# Patient Record
Sex: FEMALE | Race: OTHER | HISPANIC OR LATINO | Employment: FULL TIME | URBAN - METROPOLITAN AREA
[De-identification: names, ages, dates, MRNs, and addresses within clinical notes are randomized per-mention and may not be internally consistent; named-entity substitution may affect disease eponyms.]

---

## 2020-07-20 ENCOUNTER — HOSPITAL ENCOUNTER (OUTPATIENT)
Dept: RADIOLOGY | Facility: MEDICAL CENTER | Age: 40
End: 2020-07-20
Attending: NURSE PRACTITIONER
Payer: COMMERCIAL

## 2020-07-20 DIAGNOSIS — R10.84 GENERALIZED ABDOMINAL PAIN: ICD-10-CM

## 2020-07-20 PROCEDURE — 76700 US EXAM ABDOM COMPLETE: CPT

## 2021-02-04 ENCOUNTER — HOSPITAL ENCOUNTER (OUTPATIENT)
Dept: HOSPITAL 8 - RAD | Age: 41
Discharge: HOME | End: 2021-02-04
Attending: INTERNAL MEDICINE
Payer: SELF-PAY

## 2021-02-04 DIAGNOSIS — K21.9: ICD-10-CM

## 2021-02-04 DIAGNOSIS — R13.19: ICD-10-CM

## 2021-02-04 DIAGNOSIS — R09.89: Primary | ICD-10-CM

## 2021-02-04 PROCEDURE — 74220 X-RAY XM ESOPHAGUS 1CNTRST: CPT

## 2021-02-04 PROCEDURE — 71046 X-RAY EXAM CHEST 2 VIEWS: CPT

## 2021-02-08 ENCOUNTER — HOSPITAL ENCOUNTER (OUTPATIENT)
Dept: HOSPITAL 8 - RAD | Age: 41
Discharge: HOME | End: 2021-02-08
Attending: INTERNAL MEDICINE
Payer: SELF-PAY

## 2021-02-08 DIAGNOSIS — K21.9: Primary | ICD-10-CM

## 2021-02-08 DIAGNOSIS — R13.19: ICD-10-CM

## 2021-02-08 DIAGNOSIS — R09.89: ICD-10-CM

## 2021-02-08 PROCEDURE — 74230 X-RAY XM SWLNG FUNCJ C+: CPT

## 2022-11-18 ENCOUNTER — IMMUNIZATION (OUTPATIENT)
Dept: OCCUPATIONAL MEDICINE | Facility: CLINIC | Age: 42
End: 2022-11-18

## 2022-11-18 DIAGNOSIS — Z23 NEED FOR VACCINATION: Primary | ICD-10-CM

## 2022-11-18 PROCEDURE — 99026 IN-HOSPITAL ON CALL SERVICE: CPT | Performed by: PREVENTIVE MEDICINE

## 2022-11-18 PROCEDURE — 90746 HEPB VACCINE 3 DOSE ADULT IM: CPT | Performed by: NURSE PRACTITIONER

## 2023-08-22 ENCOUNTER — HOSPITAL ENCOUNTER (OUTPATIENT)
Dept: LAB | Facility: MEDICAL CENTER | Age: 43
End: 2023-08-22
Attending: PHYSICIAN ASSISTANT
Payer: COMMERCIAL

## 2023-08-22 LAB
ALBUMIN SERPL BCP-MCNC: 4.1 G/DL (ref 3.2–4.9)
ALBUMIN/GLOB SERPL: 1.4 G/DL
ALP SERPL-CCNC: 123 U/L (ref 30–99)
ALT SERPL-CCNC: 17 U/L (ref 2–50)
ANION GAP SERPL CALC-SCNC: 12 MMOL/L (ref 7–16)
AST SERPL-CCNC: 15 U/L (ref 12–45)
BILIRUB SERPL-MCNC: <0.2 MG/DL (ref 0.1–1.5)
BUN SERPL-MCNC: 11 MG/DL (ref 8–22)
CALCIUM ALBUM COR SERPL-MCNC: 8.8 MG/DL (ref 8.5–10.5)
CALCIUM SERPL-MCNC: 8.9 MG/DL (ref 8.5–10.5)
CHLORIDE SERPL-SCNC: 101 MMOL/L (ref 96–112)
CHOLEST SERPL-MCNC: 295 MG/DL (ref 100–199)
CO2 SERPL-SCNC: 23 MMOL/L (ref 20–33)
CREAT SERPL-MCNC: 0.52 MG/DL (ref 0.5–1.4)
EST. AVERAGE GLUCOSE BLD GHB EST-MCNC: 355 MG/DL
FASTING STATUS PATIENT QL REPORTED: NORMAL
GFR SERPLBLD CREATININE-BSD FMLA CKD-EPI: 117 ML/MIN/1.73 M 2
GLOBULIN SER CALC-MCNC: 3 G/DL (ref 1.9–3.5)
GLUCOSE SERPL-MCNC: 334 MG/DL (ref 65–99)
HBA1C MFR BLD: 14 % (ref 4–5.6)
HCV AB SER QL: NORMAL
HDLC SERPL-MCNC: 48 MG/DL
LDLC SERPL CALC-MCNC: 173 MG/DL
POTASSIUM SERPL-SCNC: 4.2 MMOL/L (ref 3.6–5.5)
PROT SERPL-MCNC: 7.1 G/DL (ref 6–8.2)
SODIUM SERPL-SCNC: 136 MMOL/L (ref 135–145)
TRIGL SERPL-MCNC: 369 MG/DL (ref 0–149)
TSH SERPL DL<=0.005 MIU/L-ACNC: 1.15 UIU/ML (ref 0.38–5.33)

## 2023-08-22 PROCEDURE — 86803 HEPATITIS C AB TEST: CPT

## 2023-08-22 PROCEDURE — 84443 ASSAY THYROID STIM HORMONE: CPT

## 2023-08-22 PROCEDURE — 80053 COMPREHEN METABOLIC PANEL: CPT

## 2023-08-22 PROCEDURE — 36415 COLL VENOUS BLD VENIPUNCTURE: CPT

## 2023-08-22 PROCEDURE — 83036 HEMOGLOBIN GLYCOSYLATED A1C: CPT

## 2023-08-22 PROCEDURE — 80061 LIPID PANEL: CPT

## 2023-09-05 ENCOUNTER — HOSPITAL ENCOUNTER (OUTPATIENT)
Facility: MEDICAL CENTER | Age: 43
End: 2023-09-05
Attending: PHYSICIAN ASSISTANT
Payer: COMMERCIAL

## 2023-09-05 PROCEDURE — 87624 HPV HI-RISK TYP POOLED RSLT: CPT

## 2023-09-05 PROCEDURE — 88175 CYTOPATH C/V AUTO FLUID REDO: CPT

## 2023-09-06 LAB — AMBIGUOUS DTTM AMBI4: NORMAL

## 2023-09-07 LAB — AMBIGUOUS DTTM AMBI4: NORMAL

## 2023-09-08 LAB
CYTOLOGIST CVX/VAG CYTO: NORMAL
CYTOLOGY CVX/VAG DOC CYTO: NORMAL
CYTOLOGY CVX/VAG DOC THIN PREP: NORMAL
HPV I/H RISK 4 DNA CVX QL PROBE+SIG AMP: NEGATIVE
HPV REFLEX NL1174300: NORMAL
NOTE NL11727A: NORMAL
OTHER STN SPEC: NORMAL
STAT OF ADQ CVX/VAG CYTO-IMP: NORMAL

## 2023-09-19 ENCOUNTER — HOSPITAL ENCOUNTER (OUTPATIENT)
Facility: MEDICAL CENTER | Age: 43
End: 2023-09-19
Attending: PHYSICIAN ASSISTANT
Payer: COMMERCIAL

## 2023-09-19 ENCOUNTER — HOSPITAL ENCOUNTER (OUTPATIENT)
Dept: LAB | Facility: MEDICAL CENTER | Age: 43
End: 2023-09-19
Attending: PHYSICIAN ASSISTANT
Payer: COMMERCIAL

## 2023-09-19 LAB
ANION GAP SERPL CALC-SCNC: 12 MMOL/L (ref 7–16)
BUN SERPL-MCNC: 11 MG/DL (ref 8–22)
CALCIUM SERPL-MCNC: 9.4 MG/DL (ref 8.5–10.5)
CHLORIDE SERPL-SCNC: 103 MMOL/L (ref 96–112)
CO2 SERPL-SCNC: 22 MMOL/L (ref 20–33)
CREAT SERPL-MCNC: 0.52 MG/DL (ref 0.5–1.4)
EST. AVERAGE GLUCOSE BLD GHB EST-MCNC: 335 MG/DL
GFR SERPLBLD CREATININE-BSD FMLA CKD-EPI: 117 ML/MIN/1.73 M 2
GLUCOSE SERPL-MCNC: 257 MG/DL (ref 65–99)
HBA1C MFR BLD: 13.3 % (ref 4–5.6)
POTASSIUM SERPL-SCNC: 4.2 MMOL/L (ref 3.6–5.5)
SODIUM SERPL-SCNC: 137 MMOL/L (ref 135–145)

## 2023-09-19 PROCEDURE — 87186 SC STD MICRODIL/AGAR DIL: CPT

## 2023-09-19 PROCEDURE — 83036 HEMOGLOBIN GLYCOSYLATED A1C: CPT

## 2023-09-19 PROCEDURE — 87086 URINE CULTURE/COLONY COUNT: CPT

## 2023-09-19 PROCEDURE — 87077 CULTURE AEROBIC IDENTIFY: CPT

## 2023-09-19 PROCEDURE — 80048 BASIC METABOLIC PNL TOTAL CA: CPT

## 2023-09-19 PROCEDURE — 36415 COLL VENOUS BLD VENIPUNCTURE: CPT

## 2023-09-21 LAB
AMBIGUOUS DTTM AMBI4: NORMAL
SIGNIFICANT IND 70042: NORMAL
SITE SITE: NORMAL
SOURCE SOURCE: NORMAL

## 2023-09-23 LAB
BACTERIA UR CULT: ABNORMAL
BACTERIA UR CULT: ABNORMAL
SIGNIFICANT IND 70042: ABNORMAL
SITE SITE: ABNORMAL
SOURCE SOURCE: ABNORMAL

## 2024-03-11 ENCOUNTER — NON-PROVIDER VISIT (OUTPATIENT)
Dept: OCCUPATIONAL MEDICINE | Facility: CLINIC | Age: 44
End: 2024-03-11

## 2024-03-11 DIAGNOSIS — Z02.89 ENCOUNTER FOR OCCUPATIONAL HEALTH ASSESSMENT: Primary | ICD-10-CM

## 2024-03-12 PROCEDURE — 90746 HEPB VACCINE 3 DOSE ADULT IM: CPT | Performed by: PREVENTIVE MEDICINE

## 2024-08-06 ENCOUNTER — NON-PROVIDER VISIT (OUTPATIENT)
Dept: OCCUPATIONAL MEDICINE | Facility: CLINIC | Age: 44
End: 2024-08-06
Payer: COMMERCIAL

## 2024-08-06 DIAGNOSIS — Z23 NEED FOR VACCINATION: Primary | ICD-10-CM

## 2024-08-06 PROCEDURE — 90746 HEPB VACCINE 3 DOSE ADULT IM: CPT | Performed by: PREVENTIVE MEDICINE

## 2024-08-06 PROCEDURE — 90471 IMMUNIZATION ADMIN: CPT | Performed by: PREVENTIVE MEDICINE

## 2024-08-28 ENCOUNTER — HOSPITAL ENCOUNTER (OUTPATIENT)
Dept: LAB | Facility: MEDICAL CENTER | Age: 44
End: 2024-08-28
Attending: PHYSICIAN ASSISTANT
Payer: COMMERCIAL

## 2024-08-28 LAB
BASOPHILS # BLD AUTO: 0.8 % (ref 0–1.8)
BASOPHILS # BLD: 0.08 K/UL (ref 0–0.12)
CRP SERPL HS-MCNC: 0.49 MG/DL (ref 0–0.75)
EOSINOPHIL # BLD AUTO: 0.16 K/UL (ref 0–0.51)
EOSINOPHIL NFR BLD: 1.6 % (ref 0–6.9)
ERYTHROCYTE [DISTWIDTH] IN BLOOD BY AUTOMATED COUNT: 42.9 FL (ref 35.9–50)
ERYTHROCYTE [SEDIMENTATION RATE] IN BLOOD BY WESTERGREN METHOD: 9 MM/HOUR (ref 0–25)
HCT VFR BLD AUTO: 44.5 % (ref 37–47)
HGB BLD-MCNC: 14.4 G/DL (ref 12–16)
IMM GRANULOCYTES # BLD AUTO: 0.02 K/UL (ref 0–0.11)
IMM GRANULOCYTES NFR BLD AUTO: 0.2 % (ref 0–0.9)
LIPASE SERPL-CCNC: 23 U/L (ref 11–82)
LYMPHOCYTES # BLD AUTO: 3.68 K/UL (ref 1–4.8)
LYMPHOCYTES NFR BLD: 37.3 % (ref 22–41)
MCH RBC QN AUTO: 26.1 PG (ref 27–33)
MCHC RBC AUTO-ENTMCNC: 32.4 G/DL (ref 32.2–35.5)
MCV RBC AUTO: 80.8 FL (ref 81.4–97.8)
MONOCYTES # BLD AUTO: 0.47 K/UL (ref 0–0.85)
MONOCYTES NFR BLD AUTO: 4.8 % (ref 0–13.4)
NEUTROPHILS # BLD AUTO: 5.45 K/UL (ref 1.82–7.42)
NEUTROPHILS NFR BLD: 55.3 % (ref 44–72)
NRBC # BLD AUTO: 0 K/UL
NRBC BLD-RTO: 0 /100 WBC (ref 0–0.2)
PLATELET # BLD AUTO: 272 K/UL (ref 164–446)
PMV BLD AUTO: 12.8 FL (ref 9–12.9)
RBC # BLD AUTO: 5.51 M/UL (ref 4.2–5.4)
WBC # BLD AUTO: 9.9 K/UL (ref 4.8–10.8)

## 2024-08-28 PROCEDURE — 83690 ASSAY OF LIPASE: CPT

## 2024-08-28 PROCEDURE — 85652 RBC SED RATE AUTOMATED: CPT

## 2024-08-28 PROCEDURE — 85025 COMPLETE CBC W/AUTO DIFF WBC: CPT

## 2024-08-28 PROCEDURE — 86140 C-REACTIVE PROTEIN: CPT

## 2024-08-28 PROCEDURE — 36415 COLL VENOUS BLD VENIPUNCTURE: CPT

## 2025-03-13 ENCOUNTER — HOSPITAL ENCOUNTER (OUTPATIENT)
Dept: LAB | Facility: MEDICAL CENTER | Age: 45
End: 2025-03-13
Payer: COMMERCIAL

## 2025-03-13 LAB
ERYTHROCYTE [DISTWIDTH] IN BLOOD BY AUTOMATED COUNT: 40.7 FL (ref 35.9–50)
HCT VFR BLD AUTO: 44.3 % (ref 37–47)
HGB BLD-MCNC: 14.1 G/DL (ref 12–16)
MCH RBC QN AUTO: 26.9 PG (ref 27–33)
MCHC RBC AUTO-ENTMCNC: 31.8 G/DL (ref 32.2–35.5)
MCV RBC AUTO: 84.4 FL (ref 81.4–97.8)
PLATELET # BLD AUTO: 279 K/UL (ref 164–446)
PMV BLD AUTO: 12 FL (ref 9–12.9)
RBC # BLD AUTO: 5.25 M/UL (ref 4.2–5.4)
WBC # BLD AUTO: 8.4 K/UL (ref 4.8–10.8)

## 2025-03-13 PROCEDURE — 36415 COLL VENOUS BLD VENIPUNCTURE: CPT

## 2025-03-13 PROCEDURE — 80061 LIPID PANEL: CPT

## 2025-03-13 PROCEDURE — 84443 ASSAY THYROID STIM HORMONE: CPT

## 2025-03-13 PROCEDURE — 80053 COMPREHEN METABOLIC PANEL: CPT

## 2025-03-13 PROCEDURE — 85027 COMPLETE CBC AUTOMATED: CPT

## 2025-03-14 LAB
ALBUMIN SERPL BCP-MCNC: 4 G/DL (ref 3.2–4.9)
ALBUMIN/GLOB SERPL: 1.2 G/DL
ALP SERPL-CCNC: 108 U/L (ref 30–99)
ALT SERPL-CCNC: 17 U/L (ref 2–50)
ANION GAP SERPL CALC-SCNC: 13 MMOL/L (ref 7–16)
AST SERPL-CCNC: 14 U/L (ref 12–45)
BILIRUB SERPL-MCNC: <0.2 MG/DL (ref 0.1–1.5)
BUN SERPL-MCNC: 10 MG/DL (ref 8–22)
CALCIUM ALBUM COR SERPL-MCNC: 9.3 MG/DL (ref 8.5–10.5)
CALCIUM SERPL-MCNC: 9.3 MG/DL (ref 8.5–10.5)
CHLORIDE SERPL-SCNC: 102 MMOL/L (ref 96–112)
CHOLEST SERPL-MCNC: 287 MG/DL (ref 100–199)
CO2 SERPL-SCNC: 20 MMOL/L (ref 20–33)
CREAT SERPL-MCNC: 0.62 MG/DL (ref 0.5–1.4)
GFR SERPLBLD CREATININE-BSD FMLA CKD-EPI: 111 ML/MIN/1.73 M 2
GLOBULIN SER CALC-MCNC: 3.3 G/DL (ref 1.9–3.5)
GLUCOSE SERPL-MCNC: 297 MG/DL (ref 65–99)
HDLC SERPL-MCNC: 50 MG/DL
LDLC SERPL CALC-MCNC: 177 MG/DL
POTASSIUM SERPL-SCNC: 4.5 MMOL/L (ref 3.6–5.5)
PROT SERPL-MCNC: 7.3 G/DL (ref 6–8.2)
SODIUM SERPL-SCNC: 135 MMOL/L (ref 135–145)
TRIGL SERPL-MCNC: 300 MG/DL (ref 0–149)
TSH SERPL DL<=0.005 MIU/L-ACNC: 0.66 UIU/ML (ref 0.38–5.33)

## 2025-04-18 LAB
FLUAV RNA SPEC QL NAA+PROBE: NEGATIVE
FLUBV RNA SPEC QL NAA+PROBE: NEGATIVE
RSV RNA SPEC QL NAA+PROBE: NEGATIVE
SARS-COV-2 RNA RESP QL NAA+PROBE: NOTDETECTED

## 2025-04-18 PROCEDURE — 36415 COLL VENOUS BLD VENIPUNCTURE: CPT

## 2025-04-18 PROCEDURE — 99284 EMERGENCY DEPT VISIT MOD MDM: CPT

## 2025-04-18 PROCEDURE — 0241U HCHG SARS-COV-2 COVID-19 NFCT DS RESP RNA 4 TRGT ED POC: CPT

## 2025-04-18 PROCEDURE — 96374 THER/PROPH/DIAG INJ IV PUSH: CPT

## 2025-04-18 PROCEDURE — 96375 TX/PRO/DX INJ NEW DRUG ADDON: CPT

## 2025-04-18 ASSESSMENT — FIBROSIS 4 INDEX: FIB4 SCORE: 0.56

## 2025-04-18 ASSESSMENT — PAIN DESCRIPTION - PAIN TYPE: TYPE: ACUTE PAIN

## 2025-04-19 ENCOUNTER — PHARMACY VISIT (OUTPATIENT)
Dept: PHARMACY | Facility: MEDICAL CENTER | Age: 45
End: 2025-04-19
Payer: COMMERCIAL

## 2025-04-19 ENCOUNTER — HOSPITAL ENCOUNTER (EMERGENCY)
Facility: MEDICAL CENTER | Age: 45
End: 2025-04-19
Attending: STUDENT IN AN ORGANIZED HEALTH CARE EDUCATION/TRAINING PROGRAM
Payer: COMMERCIAL

## 2025-04-19 VITALS
HEART RATE: 76 BPM | SYSTOLIC BLOOD PRESSURE: 113 MMHG | OXYGEN SATURATION: 93 % | TEMPERATURE: 99.2 F | BODY MASS INDEX: 29.02 KG/M2 | WEIGHT: 174.16 LBS | HEIGHT: 65 IN | RESPIRATION RATE: 18 BRPM | DIASTOLIC BLOOD PRESSURE: 56 MMHG

## 2025-04-19 DIAGNOSIS — N12 PYELONEPHRITIS: ICD-10-CM

## 2025-04-19 LAB
ALBUMIN SERPL BCP-MCNC: 3.5 G/DL (ref 3.2–4.9)
ALBUMIN/GLOB SERPL: 0.9 G/DL
ALP SERPL-CCNC: 124 U/L (ref 30–99)
ALT SERPL-CCNC: 20 U/L (ref 2–50)
ANION GAP SERPL CALC-SCNC: 12 MMOL/L (ref 7–16)
APPEARANCE UR: ABNORMAL
AST SERPL-CCNC: 16 U/L (ref 12–45)
BACTERIA #/AREA URNS HPF: ABNORMAL /HPF
BASOPHILS # BLD AUTO: 0.4 % (ref 0–1.8)
BASOPHILS # BLD: 0.04 K/UL (ref 0–0.12)
BILIRUB SERPL-MCNC: 0.3 MG/DL (ref 0.1–1.5)
BILIRUB UR QL STRIP.AUTO: NEGATIVE
BUN SERPL-MCNC: 17 MG/DL (ref 8–22)
CALCIUM ALBUM COR SERPL-MCNC: 9.2 MG/DL (ref 8.5–10.5)
CALCIUM SERPL-MCNC: 8.8 MG/DL (ref 8.5–10.5)
CASTS URNS QL MICRO: ABNORMAL /LPF (ref 0–2)
CHLORIDE SERPL-SCNC: 98 MMOL/L (ref 96–112)
CO2 SERPL-SCNC: 21 MMOL/L (ref 20–33)
COLOR UR: YELLOW
CREAT SERPL-MCNC: 0.79 MG/DL (ref 0.5–1.4)
EOSINOPHIL # BLD AUTO: 0.01 K/UL (ref 0–0.51)
EOSINOPHIL NFR BLD: 0.1 % (ref 0–6.9)
EPITHELIAL CELLS 1715: ABNORMAL /HPF (ref 0–5)
ERYTHROCYTE [DISTWIDTH] IN BLOOD BY AUTOMATED COUNT: 37.7 FL (ref 35.9–50)
GFR SERPLBLD CREATININE-BSD FMLA CKD-EPI: 93 ML/MIN/1.73 M 2
GLOBULIN SER CALC-MCNC: 3.8 G/DL (ref 1.9–3.5)
GLUCOSE SERPL-MCNC: 478 MG/DL (ref 65–99)
GLUCOSE UR STRIP.AUTO-MCNC: >=1000 MG/DL
HCG SERPL QL: NEGATIVE
HCT VFR BLD AUTO: 37.9 % (ref 37–47)
HGB BLD-MCNC: 12.6 G/DL (ref 12–16)
IMM GRANULOCYTES # BLD AUTO: 0.05 K/UL (ref 0–0.11)
IMM GRANULOCYTES NFR BLD AUTO: 0.5 % (ref 0–0.9)
KETONES UR STRIP.AUTO-MCNC: 15 MG/DL
LEUKOCYTE ESTERASE UR QL STRIP.AUTO: ABNORMAL
LYMPHOCYTES # BLD AUTO: 1.72 K/UL (ref 1–4.8)
LYMPHOCYTES NFR BLD: 18.1 % (ref 22–41)
MCH RBC QN AUTO: 27.4 PG (ref 27–33)
MCHC RBC AUTO-ENTMCNC: 33.2 G/DL (ref 32.2–35.5)
MCV RBC AUTO: 82.4 FL (ref 81.4–97.8)
MICRO URNS: ABNORMAL
MONOCYTES # BLD AUTO: 0.74 K/UL (ref 0–0.85)
MONOCYTES NFR BLD AUTO: 7.8 % (ref 0–13.4)
NEUTROPHILS # BLD AUTO: 6.94 K/UL (ref 1.82–7.42)
NEUTROPHILS NFR BLD: 73.1 % (ref 44–72)
NITRITE UR QL STRIP.AUTO: NEGATIVE
NRBC # BLD AUTO: 0 K/UL
NRBC BLD-RTO: 0 /100 WBC (ref 0–0.2)
PH UR STRIP.AUTO: 6 [PH] (ref 5–8)
PLATELET # BLD AUTO: 219 K/UL (ref 164–446)
PMV BLD AUTO: 11.7 FL (ref 9–12.9)
POTASSIUM SERPL-SCNC: 4.3 MMOL/L (ref 3.6–5.5)
PROT SERPL-MCNC: 7.3 G/DL (ref 6–8.2)
PROT UR QL STRIP: 100 MG/DL
RBC # BLD AUTO: 4.6 M/UL (ref 4.2–5.4)
RBC # URNS HPF: ABNORMAL /HPF (ref 0–2)
RBC UR QL AUTO: ABNORMAL
SODIUM SERPL-SCNC: 131 MMOL/L (ref 135–145)
SP GR UR STRIP.AUTO: 1.03
UROBILINOGEN UR STRIP.AUTO-MCNC: 1 EU/DL
WBC # BLD AUTO: 9.5 K/UL (ref 4.8–10.8)
WBC #/AREA URNS HPF: ABNORMAL /HPF

## 2025-04-19 PROCEDURE — A9270 NON-COVERED ITEM OR SERVICE: HCPCS | Performed by: STUDENT IN AN ORGANIZED HEALTH CARE EDUCATION/TRAINING PROGRAM

## 2025-04-19 PROCEDURE — 80053 COMPREHEN METABOLIC PANEL: CPT

## 2025-04-19 PROCEDURE — 96374 THER/PROPH/DIAG INJ IV PUSH: CPT

## 2025-04-19 PROCEDURE — 36415 COLL VENOUS BLD VENIPUNCTURE: CPT

## 2025-04-19 PROCEDURE — 700111 HCHG RX REV CODE 636 W/ 250 OVERRIDE (IP): Mod: JZ | Performed by: STUDENT IN AN ORGANIZED HEALTH CARE EDUCATION/TRAINING PROGRAM

## 2025-04-19 PROCEDURE — 81001 URINALYSIS AUTO W/SCOPE: CPT

## 2025-04-19 PROCEDURE — 84703 CHORIONIC GONADOTROPIN ASSAY: CPT

## 2025-04-19 PROCEDURE — 700105 HCHG RX REV CODE 258: Performed by: STUDENT IN AN ORGANIZED HEALTH CARE EDUCATION/TRAINING PROGRAM

## 2025-04-19 PROCEDURE — 700102 HCHG RX REV CODE 250 W/ 637 OVERRIDE(OP): Performed by: STUDENT IN AN ORGANIZED HEALTH CARE EDUCATION/TRAINING PROGRAM

## 2025-04-19 PROCEDURE — RXMED WILLOW AMBULATORY MEDICATION CHARGE: Performed by: STUDENT IN AN ORGANIZED HEALTH CARE EDUCATION/TRAINING PROGRAM

## 2025-04-19 PROCEDURE — 96375 TX/PRO/DX INJ NEW DRUG ADDON: CPT

## 2025-04-19 PROCEDURE — 85025 COMPLETE CBC W/AUTO DIFF WBC: CPT

## 2025-04-19 RX ORDER — PROCHLORPERAZINE EDISYLATE 5 MG/ML
5 INJECTION INTRAMUSCULAR; INTRAVENOUS ONCE
Status: COMPLETED | OUTPATIENT
Start: 2025-04-19 | End: 2025-04-19

## 2025-04-19 RX ORDER — DIPHENHYDRAMINE HYDROCHLORIDE 50 MG/ML
25 INJECTION, SOLUTION INTRAMUSCULAR; INTRAVENOUS ONCE
Status: COMPLETED | OUTPATIENT
Start: 2025-04-19 | End: 2025-04-19

## 2025-04-19 RX ORDER — ACETAMINOPHEN 500 MG
500 TABLET ORAL ONCE
Status: COMPLETED | OUTPATIENT
Start: 2025-04-19 | End: 2025-04-19

## 2025-04-19 RX ORDER — KETOROLAC TROMETHAMINE 15 MG/ML
15 INJECTION, SOLUTION INTRAMUSCULAR; INTRAVENOUS ONCE
Status: COMPLETED | OUTPATIENT
Start: 2025-04-19 | End: 2025-04-19

## 2025-04-19 RX ORDER — CEFTRIAXONE 2 G/1
2000 INJECTION, POWDER, FOR SOLUTION INTRAMUSCULAR; INTRAVENOUS ONCE
Status: COMPLETED | OUTPATIENT
Start: 2025-04-19 | End: 2025-04-19

## 2025-04-19 RX ORDER — SULFAMETHOXAZOLE AND TRIMETHOPRIM 800; 160 MG/1; MG/1
1 TABLET ORAL 2 TIMES DAILY
Qty: 14 TABLET | Refills: 0 | Status: ACTIVE | OUTPATIENT
Start: 2025-04-19 | End: 2025-04-26

## 2025-04-19 RX ORDER — SODIUM CHLORIDE, SODIUM LACTATE, POTASSIUM CHLORIDE, AND CALCIUM CHLORIDE .6; .31; .03; .02 G/100ML; G/100ML; G/100ML; G/100ML
1000 INJECTION, SOLUTION INTRAVENOUS ONCE
Status: COMPLETED | OUTPATIENT
Start: 2025-04-19 | End: 2025-04-19

## 2025-04-19 RX ADMIN — PROCHLORPERAZINE EDISYLATE 5 MG: 5 INJECTION INTRAMUSCULAR; INTRAVENOUS at 01:26

## 2025-04-19 RX ADMIN — SODIUM CHLORIDE, POTASSIUM CHLORIDE, SODIUM LACTATE AND CALCIUM CHLORIDE 1000 ML: 600; 310; 30; 20 INJECTION, SOLUTION INTRAVENOUS at 01:22

## 2025-04-19 RX ADMIN — CEFTRIAXONE SODIUM 2000 MG: 2 INJECTION, POWDER, FOR SOLUTION INTRAMUSCULAR; INTRAVENOUS at 03:48

## 2025-04-19 RX ADMIN — DIPHENHYDRAMINE HYDROCHLORIDE 25 MG: 50 INJECTION, SOLUTION INTRAMUSCULAR; INTRAVENOUS at 01:29

## 2025-04-19 RX ADMIN — ACETAMINOPHEN 500 MG: 500 TABLET, FILM COATED ORAL at 01:00

## 2025-04-19 RX ADMIN — KETOROLAC TROMETHAMINE 15 MG: 15 INJECTION, SOLUTION INTRAMUSCULAR; INTRAVENOUS at 01:20

## 2025-04-19 ASSESSMENT — PAIN DESCRIPTION - PAIN TYPE: TYPE: ACUTE PAIN

## 2025-04-19 NOTE — ED TRIAGE NOTES
"Chief Complaint   Patient presents with    Headache     Pt came in by [place], complaints of headache, \"my face is hot\" , fever, bodybaches and foul smelling urine x 2 days  Pt reported hse has a hx of sinus infection     Vitals:    04/18/25 2235   BP: (!) 176/86   Pulse: (!) 115   Resp: 18   Temp: (!) 38.3 °C (100.9 °F)   SpO2: 95%      POC covid swab in process  "

## 2025-04-19 NOTE — ED NOTES
Pt ambulated with steady gait to room yel 62. Pt aox4, airway patent, rr even and unlabored. Pt placed in gown and connected to continuous cardiac monitoring. Call light within reach. Family at bedside. Pt's chart up for ERP evaluatio

## 2025-04-19 NOTE — DISCHARGE INSTRUCTIONS
Your test today show that you have a kidney infection.  You should take Bactrim twice daily for the next 7 days for treatment.  You can continue to take Tylenol and ibuprofen every 6 hours for pain.  Drink plenty of water and follow-up with your primary doctor in a week for recheck.  Return to the ER if you develop any more severe pain, high fevers, recurrent vomiting or otherwise feeling worse.

## 2025-04-19 NOTE — ED NOTES
"Alison Armstrong has been discharged from the Emergency Room.    IV discontinued and gauze bandage placed, pt in possession of belongings.    Discharge instructions, which include signs and symptoms to monitor patient for, as well as detailed information regarding Pyelonephritis provided.  Patient verbalizes understanding of follow up care and medication management. All questions and concerns addressed at this time.     Patient provided with education on when to return to the ER and verbally understands with no concerns. Patient advised on setting up MyChart and information provided about patient survey.  Patient leaves ER in no apparent distress. This RN provided education regarding returning to the ER for any new concerns or changes in patient's condition.      /56   Pulse 76   Temp 37.3 °C (99.2 °F)   Resp 18   Ht 1.66 m (5' 5.35\")   Wt 79 kg (174 lb 2.6 oz)   LMP  (LMP Unknown)   SpO2 93%   BMI 28.67 kg/m²   "

## 2025-04-19 NOTE — ED PROVIDER NOTES
"ED Provider Note    CHIEF COMPLAINT  Chief Complaint   Patient presents with    Headache     [place] came in [place], complaints of headache, \"my face is hot\" , fever, bodybaches and foul smelling urine x 2 days  Pt reported hse has a hx of sinus infection       EXTERNAL RECORDS REVIEWED  Other no records available    HPI/ROS  LIMITATION TO HISTORY   Select: : None      Alison Armstrong is a 44 y.o. female who presents to the ER for evaluation of a headache.  The patient describes 3 days of a worsening temporal throbbing type headache that started gradually.  Also describes that her entire face feels hot and she has generalized aching pains throughout her body as well as some lower abdominal pain, flank pain and pain with urination.  Reports she was treated with Macrobid for a UTI last week and completed the course of antibiotics.  She states the burning when she pees has improved but not gone away entirely.  She reports nausea but denies vomiting or diarrhea.  She also reports that she was recently treated for a sinus infection.  She denies any ongoing facial pain or swelling or fullness in her sinus area.  She denies vision changes or dizziness.    PAST MEDICAL HISTORY   Recent UTI and bacterial sinusitis    SURGICAL HISTORY  patient denies any surgical history    FAMILY HISTORY  No family history on file.    SOCIAL HISTORY  Social History     Tobacco Use    Smoking status: Not on file    Smokeless tobacco: Not on file   Substance and Sexual Activity    Alcohol use: Not on file    Drug use: Not on file    Sexual activity: Not on file       CURRENT MEDICATIONS  Home Medications       Reviewed by Alicia Mckenzie R.N. (Registered Nurse) on 04/18/25 at 2036  Med List Status: Partial     Medication Last Dose Status        Patient Danny Taking any Medications                           ALLERGIES  No Known Allergies    PHYSICAL EXAM  VITAL SIGNS: /56   Pulse 76   Temp 37.3 °C (99.2 °F)   Resp 18   Ht 1.66 m " "(5' 5.35\")   Wt 79 kg (174 lb 2.6 oz)   LMP  (LMP Unknown)   SpO2 93%   BMI 28.67 kg/m²    Constitutional: No acute distress  HEENT: Atraumatic, normocephalic, pupils are equal round reactive to light, nose normal, mouth shows dry mucous membranes  Neck: Supple, no JVD, no tracheal deviation.  Full range of motion  Cardiovascular: Mildly tachycardic, regular rhythm.  No murmurs.  Thorax & Lungs: No respiratory distress, no wheezes, rales or rhonchi, no chest wall tenderness.  GI: Soft, non-distended, non-tender, no rebound.  No CVA tenderness  Skin: Warm, dry, no acute rash or lesion  Musculoskeletal: Moving all extremities, no acute deformity, no edema, no tenderness  Neurologic: A&Ox3, at baseline mentation  Psychiatric: Appropriate affect for situation at this time      EKG/LABS  Labs Reviewed   URINALYSIS - Abnormal; Notable for the following components:       Result Value    Character Cloudy (*)     Glucose >=1000 (*)     Ketones 15 (*)     Protein 100 (*)     Leukocyte Esterase Small (*)     Occult Blood Small (*)     All other components within normal limits   CBC WITH DIFFERENTIAL - Abnormal; Notable for the following components:    Neutrophils-Polys 73.10 (*)     Lymphocytes 18.10 (*)     All other components within normal limits   COMP METABOLIC PANEL - Abnormal; Notable for the following components:    Sodium 131 (*)     Glucose 478 (*)     Alkaline Phosphatase 124 (*)     Globulin 3.8 (*)     All other components within normal limits   URINE MICROSCOPIC (W/UA) - Abnormal; Notable for the following components:    WBC 21-50 (*)     RBC 6-10 (*)     Bacteria Many (*)     All other components within normal limits   HCG QUAL SERUM   ESTIMATED GFR   POC COV-2, FLU A/B, RSV BY PCR       COURSE & MEDICAL DECISION MAKING    ASSESSMENT, COURSE AND PLAN  Care Narrative:     Patient presents to the emergency department for evaluation of a headache and general malaise, dysuria and flank pain.  Recently treated " with oral antibiotic therapy for a UTI with ongoing symptoms.  She is febrile and tachycardic on arrival but normotensive and does not appear systemically ill.  Suspect pyelonephritis.  Urinalysis obtained confirming acute infection.  Remainder of labs with no AVA, significant electrolyte derangement, leukocytosis though she does have a slight left shift.  She is not pregnant.  No history of kidney stones, her symptoms are bilateral and I feel therefore that obstructing ureterolithiasis is unlikely.  She was medicated for pyelonephritis with a dose of ceftriaxone with a plan to place her on oral Bactrim at discharge.  Her symptoms were managed with a migraine cocktail after which she was feeling significantly better and her heart rate normalized with a fluid bolus.  She was encouraged to follow-up with her primary doctor in a week for recheck.  Return precautions discussed and all questions answered and she was discharged in stable condition.    Hydration: Based on the patient's presentation of Tachycardia the patient was given IV fluids. IV Hydration was used because oral hydration was not adequate alone. Upon recheck following hydration, the patient was improved .          ADDITIONAL PROBLEMS MANAGED  None    DISPOSITION AND DISCUSSIONS  I have discussed management of the patient with the following physicians and NILA's: None    Decision tools and prescription drugs considered including, but not limited to: Antibiotics ceftriaxone .    FINAL DIAGNOSIS  1. Pyelonephritis         Electronically signed by: Gerardo Rowe M.D., 4/19/2025 12:45 AM

## 2025-04-28 ENCOUNTER — HOSPITAL ENCOUNTER (EMERGENCY)
Facility: MEDICAL CENTER | Age: 45
End: 2025-04-28
Payer: COMMERCIAL

## 2025-04-28 VITALS
WEIGHT: 176.59 LBS | HEIGHT: 65 IN | RESPIRATION RATE: 16 BRPM | BODY MASS INDEX: 29.42 KG/M2 | TEMPERATURE: 98.5 F | DIASTOLIC BLOOD PRESSURE: 82 MMHG | SYSTOLIC BLOOD PRESSURE: 146 MMHG | OXYGEN SATURATION: 96 % | HEART RATE: 86 BPM

## 2025-04-28 LAB
ALBUMIN SERPL BCP-MCNC: 3.6 G/DL (ref 3.2–4.9)
ALBUMIN/GLOB SERPL: 0.9 G/DL
ALP SERPL-CCNC: 149 U/L (ref 30–99)
ALT SERPL-CCNC: 21 U/L (ref 2–50)
ANION GAP SERPL CALC-SCNC: 13 MMOL/L (ref 7–16)
AST SERPL-CCNC: 11 U/L (ref 12–45)
BASOPHILS # BLD AUTO: 0.4 % (ref 0–1.8)
BASOPHILS # BLD: 0.05 K/UL (ref 0–0.12)
BILIRUB SERPL-MCNC: <0.2 MG/DL (ref 0.1–1.5)
BUN SERPL-MCNC: 23 MG/DL (ref 8–22)
CALCIUM ALBUM COR SERPL-MCNC: 9.5 MG/DL (ref 8.5–10.5)
CALCIUM SERPL-MCNC: 9.2 MG/DL (ref 8.5–10.5)
CHLORIDE SERPL-SCNC: 98 MMOL/L (ref 96–112)
CO2 SERPL-SCNC: 21 MMOL/L (ref 20–33)
CREAT SERPL-MCNC: 0.93 MG/DL (ref 0.5–1.4)
EOSINOPHIL # BLD AUTO: 0.13 K/UL (ref 0–0.51)
EOSINOPHIL NFR BLD: 1.1 % (ref 0–6.9)
ERYTHROCYTE [DISTWIDTH] IN BLOOD BY AUTOMATED COUNT: 38.3 FL (ref 35.9–50)
GFR SERPLBLD CREATININE-BSD FMLA CKD-EPI: 76 ML/MIN/1.73 M 2
GLOBULIN SER CALC-MCNC: 4.2 G/DL (ref 1.9–3.5)
GLUCOSE SERPL-MCNC: 348 MG/DL (ref 65–99)
HCG SERPL QL: NEGATIVE
HCT VFR BLD AUTO: 33.9 % (ref 37–47)
HGB BLD-MCNC: 11 G/DL (ref 12–16)
IMM GRANULOCYTES # BLD AUTO: 0.06 K/UL (ref 0–0.11)
IMM GRANULOCYTES NFR BLD AUTO: 0.5 % (ref 0–0.9)
LIPASE SERPL-CCNC: 24 U/L (ref 11–82)
LYMPHOCYTES # BLD AUTO: 2.18 K/UL (ref 1–4.8)
LYMPHOCYTES NFR BLD: 18.3 % (ref 22–41)
MCH RBC QN AUTO: 27.3 PG (ref 27–33)
MCHC RBC AUTO-ENTMCNC: 32.4 G/DL (ref 32.2–35.5)
MCV RBC AUTO: 84.1 FL (ref 81.4–97.8)
MONOCYTES # BLD AUTO: 0.68 K/UL (ref 0–0.85)
MONOCYTES NFR BLD AUTO: 5.7 % (ref 0–13.4)
NEUTROPHILS # BLD AUTO: 8.84 K/UL (ref 1.82–7.42)
NEUTROPHILS NFR BLD: 74 % (ref 44–72)
NRBC # BLD AUTO: 0 K/UL
NRBC BLD-RTO: 0 /100 WBC (ref 0–0.2)
PLATELET # BLD AUTO: 377 K/UL (ref 164–446)
PMV BLD AUTO: 10.8 FL (ref 9–12.9)
POTASSIUM SERPL-SCNC: 4.5 MMOL/L (ref 3.6–5.5)
PROT SERPL-MCNC: 7.8 G/DL (ref 6–8.2)
RBC # BLD AUTO: 4.03 M/UL (ref 4.2–5.4)
SODIUM SERPL-SCNC: 132 MMOL/L (ref 135–145)
WBC # BLD AUTO: 11.9 K/UL (ref 4.8–10.8)

## 2025-04-28 PROCEDURE — 83690 ASSAY OF LIPASE: CPT

## 2025-04-28 PROCEDURE — 85025 COMPLETE CBC W/AUTO DIFF WBC: CPT

## 2025-04-28 PROCEDURE — 80053 COMPREHEN METABOLIC PANEL: CPT

## 2025-04-28 PROCEDURE — 84703 CHORIONIC GONADOTROPIN ASSAY: CPT

## 2025-04-28 PROCEDURE — 302449 STATCHG TRIAGE ONLY (STATISTIC)

## 2025-04-28 ASSESSMENT — FIBROSIS 4 INDEX: FIB4 SCORE: 0.75

## 2025-04-28 ASSESSMENT — PAIN DESCRIPTION - PAIN TYPE: TYPE: ACUTE PAIN

## 2025-04-29 ENCOUNTER — HOSPITAL ENCOUNTER (INPATIENT)
Facility: MEDICAL CENTER | Age: 45
End: 2025-04-29
Attending: EMERGENCY MEDICINE | Admitting: HOSPITALIST
Payer: COMMERCIAL

## 2025-04-29 DIAGNOSIS — N12 PYELONEPHRITIS: ICD-10-CM

## 2025-04-29 PROBLEM — E78.5 DYSLIPIDEMIA: Status: ACTIVE | Noted: 2025-04-29

## 2025-04-29 PROBLEM — E11.9 TYPE 2 DIABETES MELLITUS WITHOUT COMPLICATION, WITHOUT LONG-TERM CURRENT USE OF INSULIN (HCC): Status: ACTIVE | Noted: 2025-04-29

## 2025-04-29 PROBLEM — I10 PRIMARY HYPERTENSION: Status: ACTIVE | Noted: 2025-04-29

## 2025-04-29 PROBLEM — E66.3 OVERWEIGHT (BMI 25.0-29.9): Status: ACTIVE | Noted: 2025-04-29

## 2025-04-29 PROBLEM — N10 ACUTE PYELONEPHRITIS: Status: ACTIVE | Noted: 2025-04-29

## 2025-04-29 LAB
ALBUMIN SERPL BCP-MCNC: 3.5 G/DL (ref 3.2–4.9)
ALBUMIN/GLOB SERPL: 0.9 G/DL
ALP SERPL-CCNC: 137 U/L (ref 30–99)
ALT SERPL-CCNC: 20 U/L (ref 2–50)
ANION GAP SERPL CALC-SCNC: 10 MMOL/L (ref 7–16)
APPEARANCE UR: ABNORMAL
AST SERPL-CCNC: 11 U/L (ref 12–45)
BACTERIA #/AREA URNS HPF: ABNORMAL /HPF
BASOPHILS # BLD AUTO: 0.4 % (ref 0–1.8)
BASOPHILS # BLD: 0.04 K/UL (ref 0–0.12)
BILIRUB SERPL-MCNC: <0.2 MG/DL (ref 0.1–1.5)
BILIRUB UR QL STRIP.AUTO: NEGATIVE
BUN SERPL-MCNC: 20 MG/DL (ref 8–22)
CALCIUM ALBUM COR SERPL-MCNC: 9.2 MG/DL (ref 8.5–10.5)
CALCIUM SERPL-MCNC: 8.8 MG/DL (ref 8.5–10.5)
CASTS URNS QL MICRO: ABNORMAL /LPF (ref 0–2)
CHLORIDE SERPL-SCNC: 105 MMOL/L (ref 96–112)
CO2 SERPL-SCNC: 22 MMOL/L (ref 20–33)
COLOR UR: YELLOW
CREAT SERPL-MCNC: 0.75 MG/DL (ref 0.5–1.4)
EOSINOPHIL # BLD AUTO: 0.1 K/UL (ref 0–0.51)
EOSINOPHIL NFR BLD: 0.9 % (ref 0–6.9)
EPITHELIAL CELLS 1715: ABNORMAL /HPF (ref 0–5)
ERYTHROCYTE [DISTWIDTH] IN BLOOD BY AUTOMATED COUNT: 39.5 FL (ref 35.9–50)
GFR SERPLBLD CREATININE-BSD FMLA CKD-EPI: 99 ML/MIN/1.73 M 2
GLOBULIN SER CALC-MCNC: 3.9 G/DL (ref 1.9–3.5)
GLUCOSE BLD STRIP.AUTO-MCNC: 218 MG/DL (ref 65–99)
GLUCOSE BLD STRIP.AUTO-MCNC: 260 MG/DL (ref 65–99)
GLUCOSE BLD STRIP.AUTO-MCNC: 270 MG/DL (ref 65–99)
GLUCOSE SERPL-MCNC: 271 MG/DL (ref 65–99)
GLUCOSE UR STRIP.AUTO-MCNC: >=1000 MG/DL
HCG SERPL QL: NEGATIVE
HCT VFR BLD AUTO: 35.2 % (ref 37–47)
HGB BLD-MCNC: 11.6 G/DL (ref 12–16)
IMM GRANULOCYTES # BLD AUTO: 0.04 K/UL (ref 0–0.11)
IMM GRANULOCYTES NFR BLD AUTO: 0.4 % (ref 0–0.9)
KETONES UR STRIP.AUTO-MCNC: NEGATIVE MG/DL
LEUKOCYTE ESTERASE UR QL STRIP.AUTO: ABNORMAL
LIPASE SERPL-CCNC: 20 U/L (ref 11–82)
LYMPHOCYTES # BLD AUTO: 2.27 K/UL (ref 1–4.8)
LYMPHOCYTES NFR BLD: 20.2 % (ref 22–41)
MCH RBC QN AUTO: 28.1 PG (ref 27–33)
MCHC RBC AUTO-ENTMCNC: 33 G/DL (ref 32.2–35.5)
MCV RBC AUTO: 85.2 FL (ref 81.4–97.8)
MICRO URNS: ABNORMAL
MONOCYTES # BLD AUTO: 0.58 K/UL (ref 0–0.85)
MONOCYTES NFR BLD AUTO: 5.2 % (ref 0–13.4)
NEUTROPHILS # BLD AUTO: 8.21 K/UL (ref 1.82–7.42)
NEUTROPHILS NFR BLD: 72.9 % (ref 44–72)
NITRITE UR QL STRIP.AUTO: POSITIVE
NRBC # BLD AUTO: 0 K/UL
NRBC BLD-RTO: 0 /100 WBC (ref 0–0.2)
PH UR STRIP.AUTO: 5.5 [PH] (ref 5–8)
PLATELET # BLD AUTO: 370 K/UL (ref 164–446)
PMV BLD AUTO: 10.6 FL (ref 9–12.9)
POTASSIUM SERPL-SCNC: 4.2 MMOL/L (ref 3.6–5.5)
PROT SERPL-MCNC: 7.4 G/DL (ref 6–8.2)
PROT UR QL STRIP: NEGATIVE MG/DL
RBC # BLD AUTO: 4.13 M/UL (ref 4.2–5.4)
RBC # URNS HPF: ABNORMAL /HPF (ref 0–2)
RBC UR QL AUTO: ABNORMAL
SODIUM SERPL-SCNC: 137 MMOL/L (ref 135–145)
SP GR UR STRIP.AUTO: <=1.005
UROBILINOGEN UR STRIP.AUTO-MCNC: 0.2 EU/DL
WBC # BLD AUTO: 11.2 K/UL (ref 4.8–10.8)
WBC #/AREA URNS HPF: ABNORMAL /HPF

## 2025-04-29 PROCEDURE — 700111 HCHG RX REV CODE 636 W/ 250 OVERRIDE (IP): Mod: JZ | Performed by: HOSPITALIST

## 2025-04-29 PROCEDURE — 36415 COLL VENOUS BLD VENIPUNCTURE: CPT

## 2025-04-29 PROCEDURE — 87086 URINE CULTURE/COLONY COUNT: CPT

## 2025-04-29 PROCEDURE — 84703 CHORIONIC GONADOTROPIN ASSAY: CPT

## 2025-04-29 PROCEDURE — 80053 COMPREHEN METABOLIC PANEL: CPT

## 2025-04-29 PROCEDURE — A9270 NON-COVERED ITEM OR SERVICE: HCPCS | Performed by: HOSPITALIST

## 2025-04-29 PROCEDURE — 96375 TX/PRO/DX INJ NEW DRUG ADDON: CPT

## 2025-04-29 PROCEDURE — 700105 HCHG RX REV CODE 258: Performed by: HOSPITALIST

## 2025-04-29 PROCEDURE — 81001 URINALYSIS AUTO W/SCOPE: CPT

## 2025-04-29 PROCEDURE — 770006 HCHG ROOM/CARE - MED/SURG/GYN SEMI*

## 2025-04-29 PROCEDURE — 83690 ASSAY OF LIPASE: CPT

## 2025-04-29 PROCEDURE — 96372 THER/PROPH/DIAG INJ SC/IM: CPT

## 2025-04-29 PROCEDURE — 99285 EMERGENCY DEPT VISIT HI MDM: CPT

## 2025-04-29 PROCEDURE — 99223 1ST HOSP IP/OBS HIGH 75: CPT | Performed by: HOSPITALIST

## 2025-04-29 PROCEDURE — 96374 THER/PROPH/DIAG INJ IV PUSH: CPT

## 2025-04-29 PROCEDURE — 700111 HCHG RX REV CODE 636 W/ 250 OVERRIDE (IP): Mod: JZ | Performed by: EMERGENCY MEDICINE

## 2025-04-29 PROCEDURE — 85025 COMPLETE CBC W/AUTO DIFF WBC: CPT

## 2025-04-29 PROCEDURE — 96376 TX/PRO/DX INJ SAME DRUG ADON: CPT

## 2025-04-29 PROCEDURE — 87077 CULTURE AEROBIC IDENTIFY: CPT

## 2025-04-29 PROCEDURE — 87186 SC STD MICRODIL/AGAR DIL: CPT

## 2025-04-29 PROCEDURE — 700102 HCHG RX REV CODE 250 W/ 637 OVERRIDE(OP): Performed by: HOSPITALIST

## 2025-04-29 PROCEDURE — 96365 THER/PROPH/DIAG IV INF INIT: CPT

## 2025-04-29 PROCEDURE — 82962 GLUCOSE BLOOD TEST: CPT | Mod: 91

## 2025-04-29 RX ORDER — DEXTROSE MONOHYDRATE 25 G/50ML
25 INJECTION, SOLUTION INTRAVENOUS
Status: DISCONTINUED | OUTPATIENT
Start: 2025-04-29 | End: 2025-05-01 | Stop reason: HOSPADM

## 2025-04-29 RX ORDER — SULFAMETHOXAZOLE AND TRIMETHOPRIM 800; 160 MG/1; MG/1
1 TABLET ORAL 2 TIMES DAILY
Status: ON HOLD | COMMUNITY
Start: 2025-04-19 | End: 2025-05-01

## 2025-04-29 RX ORDER — LOSARTAN POTASSIUM 25 MG/1
25 TABLET ORAL DAILY
COMMUNITY

## 2025-04-29 RX ORDER — LABETALOL HYDROCHLORIDE 5 MG/ML
10 INJECTION, SOLUTION INTRAVENOUS EVERY 4 HOURS PRN
Status: DISCONTINUED | OUTPATIENT
Start: 2025-04-29 | End: 2025-05-01 | Stop reason: HOSPADM

## 2025-04-29 RX ORDER — INSULIN LISPRO 100 [IU]/ML
2-9 INJECTION, SOLUTION INTRAVENOUS; SUBCUTANEOUS
Status: DISCONTINUED | OUTPATIENT
Start: 2025-04-29 | End: 2025-05-01 | Stop reason: HOSPADM

## 2025-04-29 RX ORDER — SODIUM CHLORIDE, SODIUM LACTATE, POTASSIUM CHLORIDE, CALCIUM CHLORIDE 600; 310; 30; 20 MG/100ML; MG/100ML; MG/100ML; MG/100ML
INJECTION, SOLUTION INTRAVENOUS CONTINUOUS
Status: ACTIVE | OUTPATIENT
Start: 2025-04-29 | End: 2025-04-30

## 2025-04-29 RX ORDER — PROCHLORPERAZINE EDISYLATE 5 MG/ML
5-10 INJECTION INTRAMUSCULAR; INTRAVENOUS EVERY 4 HOURS PRN
Status: DISCONTINUED | OUTPATIENT
Start: 2025-04-29 | End: 2025-05-01 | Stop reason: HOSPADM

## 2025-04-29 RX ORDER — ACETAMINOPHEN 325 MG/1
325 TABLET ORAL EVERY 4 HOURS PRN
COMMUNITY

## 2025-04-29 RX ORDER — POLYETHYLENE GLYCOL 3350 17 G/17G
1 POWDER, FOR SOLUTION ORAL
Status: DISCONTINUED | OUTPATIENT
Start: 2025-04-29 | End: 2025-05-01 | Stop reason: HOSPADM

## 2025-04-29 RX ORDER — AMOXICILLIN 250 MG
2 CAPSULE ORAL EVERY EVENING
Status: DISCONTINUED | OUTPATIENT
Start: 2025-04-29 | End: 2025-05-01 | Stop reason: HOSPADM

## 2025-04-29 RX ORDER — KETOROLAC TROMETHAMINE 15 MG/ML
15 INJECTION, SOLUTION INTRAMUSCULAR; INTRAVENOUS EVERY 6 HOURS PRN
Status: DISCONTINUED | OUTPATIENT
Start: 2025-04-29 | End: 2025-05-01 | Stop reason: HOSPADM

## 2025-04-29 RX ORDER — ONDANSETRON 4 MG/1
4 TABLET, ORALLY DISINTEGRATING ORAL EVERY 4 HOURS PRN
Status: DISCONTINUED | OUTPATIENT
Start: 2025-04-29 | End: 2025-05-01 | Stop reason: HOSPADM

## 2025-04-29 RX ORDER — ACETAMINOPHEN 325 MG/1
650 TABLET ORAL EVERY 6 HOURS PRN
Status: DISCONTINUED | OUTPATIENT
Start: 2025-04-29 | End: 2025-05-01 | Stop reason: HOSPADM

## 2025-04-29 RX ORDER — PROMETHAZINE HYDROCHLORIDE 25 MG/1
12.5-25 TABLET ORAL EVERY 4 HOURS PRN
Status: DISCONTINUED | OUTPATIENT
Start: 2025-04-29 | End: 2025-05-01 | Stop reason: HOSPADM

## 2025-04-29 RX ORDER — LINAGLIPTIN 5 MG/1
5 TABLET, FILM COATED ORAL DAILY
COMMUNITY
Start: 2025-04-10

## 2025-04-29 RX ORDER — PROMETHAZINE HYDROCHLORIDE 25 MG/1
12.5-25 SUPPOSITORY RECTAL EVERY 4 HOURS PRN
Status: DISCONTINUED | OUTPATIENT
Start: 2025-04-29 | End: 2025-05-01 | Stop reason: HOSPADM

## 2025-04-29 RX ORDER — CEFTRIAXONE 2 G/1
2000 INJECTION, POWDER, FOR SOLUTION INTRAMUSCULAR; INTRAVENOUS ONCE
Status: COMPLETED | OUTPATIENT
Start: 2025-04-29 | End: 2025-04-29

## 2025-04-29 RX ORDER — ONDANSETRON 2 MG/ML
4 INJECTION INTRAMUSCULAR; INTRAVENOUS EVERY 4 HOURS PRN
Status: DISCONTINUED | OUTPATIENT
Start: 2025-04-29 | End: 2025-05-01 | Stop reason: HOSPADM

## 2025-04-29 RX ORDER — ROSUVASTATIN CALCIUM 20 MG/1
20 TABLET, COATED ORAL DAILY
COMMUNITY

## 2025-04-29 RX ADMIN — SENNOSIDES AND DOCUSATE SODIUM 2 TABLET: 50; 8.6 TABLET ORAL at 17:12

## 2025-04-29 RX ADMIN — INSULIN LISPRO 3 UNITS: 100 INJECTION, SOLUTION INTRAVENOUS; SUBCUTANEOUS at 12:11

## 2025-04-29 RX ADMIN — AMPICILLIN AND SULBACTAM 3 G: 1; 2 INJECTION, POWDER, FOR SOLUTION INTRAMUSCULAR; INTRAVENOUS at 17:00

## 2025-04-29 RX ADMIN — AMPICILLIN AND SULBACTAM 3 G: 1; 2 INJECTION, POWDER, FOR SOLUTION INTRAMUSCULAR; INTRAVENOUS at 23:18

## 2025-04-29 RX ADMIN — AMPICILLIN AND SULBACTAM 3 G: 1; 2 INJECTION, POWDER, FOR SOLUTION INTRAMUSCULAR; INTRAVENOUS at 12:35

## 2025-04-29 RX ADMIN — KETOROLAC TROMETHAMINE 15 MG: 15 INJECTION, SOLUTION INTRAMUSCULAR; INTRAVENOUS at 16:58

## 2025-04-29 RX ADMIN — INSULIN LISPRO 5 UNITS: 100 INJECTION, SOLUTION INTRAVENOUS; SUBCUTANEOUS at 16:52

## 2025-04-29 RX ADMIN — INSULIN LISPRO 5 UNITS: 100 INJECTION, SOLUTION INTRAVENOUS; SUBCUTANEOUS at 20:44

## 2025-04-29 RX ADMIN — RIVAROXABAN 10 MG: 10 TABLET, FILM COATED ORAL at 17:12

## 2025-04-29 RX ADMIN — CEFTRIAXONE SODIUM 2000 MG: 2 INJECTION, POWDER, FOR SOLUTION INTRAMUSCULAR; INTRAVENOUS at 09:07

## 2025-04-29 RX ADMIN — SODIUM CHLORIDE, POTASSIUM CHLORIDE, SODIUM LACTATE AND CALCIUM CHLORIDE: 600; 310; 30; 20 INJECTION, SOLUTION INTRAVENOUS at 12:34

## 2025-04-29 SDOH — ECONOMIC STABILITY: TRANSPORTATION INSECURITY
IN THE PAST 12 MONTHS, HAS LACK OF RELIABLE TRANSPORTATION KEPT YOU FROM MEDICAL APPOINTMENTS, MEETINGS, WORK OR FROM GETTING THINGS NEEDED FOR DAILY LIVING?: NO

## 2025-04-29 SDOH — ECONOMIC STABILITY: TRANSPORTATION INSECURITY
IN THE PAST 12 MONTHS, HAS THE LACK OF TRANSPORTATION KEPT YOU FROM MEDICAL APPOINTMENTS OR FROM GETTING MEDICATIONS?: NO

## 2025-04-29 ASSESSMENT — COGNITIVE AND FUNCTIONAL STATUS - GENERAL
SUGGESTED CMS G CODE MODIFIER MOBILITY: CH
MOBILITY SCORE: 24
SUGGESTED CMS G CODE MODIFIER DAILY ACTIVITY: CH
DAILY ACTIVITIY SCORE: 24

## 2025-04-29 ASSESSMENT — PAIN DESCRIPTION - PAIN TYPE
TYPE: ACUTE PAIN

## 2025-04-29 ASSESSMENT — LIFESTYLE VARIABLES
TOTAL SCORE: 0
EVER FELT BAD OR GUILTY ABOUT YOUR DRINKING: NO
HAVE YOU EVER FELT YOU SHOULD CUT DOWN ON YOUR DRINKING: NO
HOW MANY TIMES IN THE PAST YEAR HAVE YOU HAD 5 OR MORE DRINKS IN A DAY: 0
EVER HAD A DRINK FIRST THING IN THE MORNING TO STEADY YOUR NERVES TO GET RID OF A HANGOVER: NO
ALCOHOL_USE: NO
TOTAL SCORE: 0
AVERAGE NUMBER OF DAYS PER WEEK YOU HAVE A DRINK CONTAINING ALCOHOL: 0
HAVE PEOPLE ANNOYED YOU BY CRITICIZING YOUR DRINKING: NO
CONSUMPTION TOTAL: NEGATIVE
DOES PATIENT WANT TO STOP DRINKING: NO
TOTAL SCORE: 0
ON A TYPICAL DAY WHEN YOU DRINK ALCOHOL HOW MANY DRINKS DO YOU HAVE: 0

## 2025-04-29 ASSESSMENT — SOCIAL DETERMINANTS OF HEALTH (SDOH)
WITHIN THE PAST 12 MONTHS, YOU WORRIED THAT YOUR FOOD WOULD RUN OUT BEFORE YOU GOT THE MONEY TO BUY MORE: NEVER TRUE
IN THE PAST 12 MONTHS, HAS THE ELECTRIC, GAS, OIL, OR WATER COMPANY THREATENED TO SHUT OFF SERVICE IN YOUR HOME?: NO
WITHIN THE LAST YEAR, HAVE YOU BEEN KICKED, HIT, SLAPPED, OR OTHERWISE PHYSICALLY HURT BY YOUR PARTNER OR EX-PARTNER?: NO
WITHIN THE LAST YEAR, HAVE YOU BEEN AFRAID OF YOUR PARTNER OR EX-PARTNER?: NO
WITHIN THE LAST YEAR, HAVE YOU BEEN HUMILIATED OR EMOTIONALLY ABUSED IN OTHER WAYS BY YOUR PARTNER OR EX-PARTNER?: NO
WITHIN THE PAST 12 MONTHS, THE FOOD YOU BOUGHT JUST DIDN'T LAST AND YOU DIDN'T HAVE MONEY TO GET MORE: NEVER TRUE
WITHIN THE LAST YEAR, HAVE TO BEEN RAPED OR FORCED TO HAVE ANY KIND OF SEXUAL ACTIVITY BY YOUR PARTNER OR EX-PARTNER?: NO

## 2025-04-29 ASSESSMENT — ENCOUNTER SYMPTOMS
NERVOUS/ANXIOUS: 0
FEVER: 1
FLANK PAIN: 1
SORE THROAT: 0
CONSTIPATION: 0
ABDOMINAL PAIN: 0
DIZZINESS: 0
SHORTNESS OF BREATH: 0
MYALGIAS: 0
PALPITATIONS: 0
HEADACHES: 0
DIARRHEA: 0

## 2025-04-29 ASSESSMENT — PATIENT HEALTH QUESTIONNAIRE - PHQ9
SUM OF ALL RESPONSES TO PHQ9 QUESTIONS 1 AND 2: 0
1. LITTLE INTEREST OR PLEASURE IN DOING THINGS: NOT AT ALL
2. FEELING DOWN, DEPRESSED, IRRITABLE, OR HOPELESS: NOT AT ALL

## 2025-04-29 ASSESSMENT — FIBROSIS 4 INDEX
FIB4 SCORE: 0.31
FIB4 SCORE: 0.29

## 2025-04-29 NOTE — H&P
Hospital Medicine History & Physical Note    Date of Service  2025    Primary Care Physician  MAUREEN Tracy    Consultants  None    Code Status  Full Code    Chief Complaint  Chief Complaint   Patient presents with    Flank Pain       History of Presenting Illness  Alison Armstrong is a 44 y.o. female w/ a hx of DMII, HTN, DLD, who presented 2025 with bilateral flank pain, dysuria, and fever.  She was recently seen in the ER and discharged ~1 week ago with bactrim.  She completed the course of antibiotics but one day after completion of antibiotics was have dysuria, fever, and back pain. She has been using acetaminophen for pain/fever.      Here in the ER: wbc:11.2, and UA showing UTI.  She has bilateral flank pain.      I discussed the plan of care with patient.    Review of Systems  Review of Systems   Constitutional:  Positive for fever and malaise/fatigue.   HENT:  Negative for sore throat.    Respiratory:  Negative for shortness of breath.    Cardiovascular:  Negative for chest pain, palpitations and leg swelling.   Gastrointestinal:  Negative for abdominal pain, constipation and diarrhea.   Genitourinary:  Positive for dysuria, flank pain and frequency. Negative for hematuria and urgency.   Musculoskeletal:  Negative for myalgias.   Neurological:  Negative for dizziness and headaches.   Psychiatric/Behavioral:  The patient is not nervous/anxious.        Past Medical History   has a past medical history of Congestive heart failure (HCC), Diabetes (HCC), Hyperlipemia, and Hypertension.    Surgical History       Family History  Father with hx of heart disease.  Both parents alive   Family history reviewed with patient. There is no family history that is pertinent to the chief complaint.     Social History   reports that she has never smoked. She has never used smokeless tobacco. She reports that she does not drink alcohol and does not use drugs. Works at [employer] with exposure  to 2nd hand smoke.  Has 5 kids of her own from a prior marriage.      Allergies  No Known Allergies    Medications  None       Physical Exam  Temp:  [36.2 °C (97.1 °F)-36.9 °C (98.5 °F)] 36.2 °C (97.1 °F)  Pulse:  [75-86] 75  Resp:  [14-16] 15  BP: (136-147)/(64-82) 136/64  SpO2:  [96 %-97 %] 96 %  Blood Pressure: 136/64   Temperature: 36.2 °C (97.1 °F)   Pulse: 75   Respiration: 15   Pulse Oximetry: 96 %       Physical Exam  Vitals reviewed.   Constitutional:       Appearance: Normal appearance. She is not ill-appearing.   HENT:      Head: Normocephalic and atraumatic.      Nose: Nose normal.      Mouth/Throat:      Mouth: Mucous membranes are moist.   Eyes:      Extraocular Movements: Extraocular movements intact.      Conjunctiva/sclera: Conjunctivae normal.   Cardiovascular:      Rate and Rhythm: Normal rate and regular rhythm.      Heart sounds: No murmur heard.  Pulmonary:      Effort: No respiratory distress.      Breath sounds: Normal breath sounds.   Abdominal:      General: There is no distension.      Palpations: Abdomen is soft.   Musculoskeletal:      Cervical back: Neck supple.      Right lower leg: No edema.      Left lower leg: No edema.   Lymphadenopathy:      Cervical: No cervical adenopathy.   Skin:     General: Skin is dry.      Coloration: Skin is not jaundiced.   Neurological:      General: No focal deficit present.      Mental Status: She is alert and oriented to person, place, and time.      Cranial Nerves: No cranial nerve deficit.   Psychiatric:         Mood and Affect: Mood normal.         Laboratory:  Recent Labs     04/28/25  1800 04/29/25  0852   WBC 11.9* 11.2*   RBC 4.03* 4.13*   HEMOGLOBIN 11.0* 11.6*   HEMATOCRIT 33.9* 35.2*   MCV 84.1 85.2   MCH 27.3 28.1   MCHC 32.4 33.0   RDW 38.3 39.5   PLATELETCT 377 370   MPV 10.8 10.6     Recent Labs     04/28/25  1800 04/29/25  0852   SODIUM 132* 137   POTASSIUM 4.5 4.2   CHLORIDE 98 105   CO2 21 22   GLUCOSE 348* 271*   BUN 23* 20  "  CREATININE 0.93 0.75   CALCIUM 9.2 8.8     Recent Labs     04/28/25  1800 04/29/25  0852   ALTSGPT 21 20   ASTSGOT 11* 11*   ALKPHOSPHAT 149* 137*   TBILIRUBIN <0.2 <0.2   LIPASE 24 20   GLUCOSE 348* 271*         No results for input(s): \"NTPROBNP\" in the last 72 hours.      No results for input(s): \"TROPONINT\" in the last 72 hours.    Imaging:  No orders to display         Assessment/Plan:  Justification for Admission Status  I anticipate this patient will require at least two midnights for appropriate medical management, necessitating inpatient admission because failiure of outpatient antibiotics and new flank pain with fevers.    Patient will need a Med/Surg bed on MEDICAL service .  The need is secondary to acute pyelonphritis with failure of outpatient antibiotics.    * Acute pyelonephritis- (present on admission)  Assessment & Plan  Failed outpatient Bactrim.  Start Unasyn  A prior Urine culture a few years back suggest Enterococcus.  Check urine culture  Pain control  U/S kidneys, r/o stones  Pain control    Dyslipidemia  Assessment & Plan  Continue outpatient statin    Overweight (BMI 25.0-29.9)  Assessment & Plan  Body mass index is 28.8 kg/m².  Encourage healthy lifestyle and weight loss    Primary hypertension  Assessment & Plan  Monitor vitals.  Continue outpatient antihypertensives    Type 2 diabetes mellitus without complication, without long-term current use of insulin (HCC)  Assessment & Plan  A1c:13.3 one year ago.  Monitor for hypoglycemia  Monitor accuchecks and cover with SSI  Encourage healthy lifestyle and dietary choices.  Hold outpatient meds.        VTE prophylaxis: SCDs/TEDs  "

## 2025-04-29 NOTE — ED NOTES
Alison Armstrong has chosen to leave the hospital against medical advice. The attending physician has not discharged the patient. The provider is aware that the patient is leaving against medical advice. Patient expresses understanding of the risks of leaving the hospital and benefits of admission including but not limited to, the availability and proximity of nurses, physicians, monitoring, diagnostic testing, treatment, and a safe discharge plan. The patient had the opportunity to ask questions about their medical condition and recommended treatment.  Patient is aware that they may return for care at any time.

## 2025-04-29 NOTE — ED PROVIDER NOTES
ED Provider Note    CHIEF COMPLAINT  Chief Complaint   Patient presents with    Flank Pain       EXTERNAL RECORDS REVIEWED  Inpatient Notes ED note 4/19/25 when the patient was treated for pyelonephritis    HPI/ROS  LIMITATION TO HISTORY   Select: : None  OUTSIDE HISTORIAN(S):  None    Alison Armstrong is a 44 y.o. female who presents to the emergency department for evaluation of flank pain.  The patient states that she was here 10 days ago and diagnosed with a kidney infection.  She was sent home on antibiotics and finished them 3 days ago.  She states that she is having persistent bilateral flank pain.  She states that occasionally she has dysuria and she was evaluated at the [facility].  She was told that she still has blood in her urine.  She has not been given additional medications at this time.  She denies any increased frequency or hesitancy.  She admits to subjective fevers.  She admits to nausea but no vomiting.  She denies any abdominal pain.  She has not had any chest pain, shortness of breath, coughing, wheezing or sore throat.  She has a history of diabetes but is uncertain what her sugars have been running.    PAST MEDICAL HISTORY   has a past medical history of Congestive heart failure (HCC), Diabetes (HCC), Hyperlipemia, and Hypertension.    SURGICAL HISTORY  patient denies any surgical history    FAMILY HISTORY  History reviewed. No pertinent family history.    SOCIAL HISTORY  Social History     Tobacco Use    Smoking status: Never    Smokeless tobacco: Never   Vaping Use    Vaping status: Never Used   Substance and Sexual Activity    Alcohol use: Never    Drug use: Never    Sexual activity: Not on file       CURRENT MEDICATIONS  Home Medications       Reviewed by Heavenly Duran R.N. (Registered Nurse) on 04/29/25 at 0639  Med List Status: Not Addressed     Medication Last Dose Status        Patient Danny Taking any Medications                           ALLERGIES  No  "Known Allergies    PHYSICAL EXAM  VITAL SIGNS: BP (!) 146/75   Pulse 76   Temp 36.2 °C (97.1 °F)   Resp 14   Ht 1.651 m (5' 5\")   Wt 78.5 kg (173 lb 1 oz)   LMP 10/01/2024 (Approximate)   SpO2 97%   BMI 28.80 kg/m²   Constitutional: Alert and in no apparent distress.  HENT: Normocephalic atraumatic. Bilateral external ears normal. Nose normal. Mucous membranes are moist.  Eyes: Pupils are equal and reactive. Conjunctiva normal. Non-icteric sclera.   Neck: Normal range of motion without tenderness. Supple. No meningeal signs.  Cardiovascular: Regular rate and rhythm. No murmurs, gallops or rubs.  Thorax & Lungs: No increased work of breathing. Breath sounds are clear to auscultation bilaterally. No wheezing, rhonchi or rales.  Abdomen: Soft, nontender and nondistended.   Skin: Warm and dry. No rashes are noted.  Back: No bony tenderness, No CVA tenderness bilaterally.  Extremities: 2+ peripheral pulses. Cap refill is less than 2 seconds. No edema, cyanosis, or clubbing.  Musculoskeletal: Good range of motion in all major joints. No tenderness to palpation or major deformities noted.   Neurologic: Alert and appropriate for age. The patient moves all 4 extremities without obvious deficits.      EKG/LABS  Results for orders placed or performed during the hospital encounter of 04/29/25   URINALYSIS,CULTURE IF INDICATED    Collection Time: 04/29/25  7:34 AM    Specimen: Urine   Result Value Ref Range    Color Yellow     Character Hazy (A)     Specific Gravity <=1.005 <1.035    Ph 5.5 5.0 - 8.0    Glucose >=1000 (A) Negative mg/dL    Ketones Negative Negative mg/dL    Protein Negative Negative mg/dL    Bilirubin Negative Negative    Urobilinogen, Urine 0.2 <=1.0 EU/dL    Nitrite Positive (A) Negative    Leukocyte Esterase Small (A) Negative    Occult Blood Trace (A) Negative    Micro Urine Req Microscopic    URINE MICROSCOPIC (W/UA)    Collection Time: 04/29/25  7:34 AM   Result Value Ref Range    WBC  (A) " /hpf    RBC 0-2 0 - 2 /hpf    Bacteria Many (A) None /hpf    Epithelial Cells 0-2 0 - 5 /hpf    Urine Casts 0-2 0 - 2 /lpf   URINE CULTURE(NEW)    Collection Time: 04/29/25  8:16 AM    Specimen: Urine   Result Value Ref Range    Significant Indicator NEG     Source UR     Site -     Culture Result -    CBC WITH DIFFERENTIAL    Collection Time: 04/29/25  8:52 AM   Result Value Ref Range    WBC 11.2 (H) 4.8 - 10.8 K/uL    RBC 4.13 (L) 4.20 - 5.40 M/uL    Hemoglobin 11.6 (L) 12.0 - 16.0 g/dL    Hematocrit 35.2 (L) 37.0 - 47.0 %    MCV 85.2 81.4 - 97.8 fL    MCH 28.1 27.0 - 33.0 pg    MCHC 33.0 32.2 - 35.5 g/dL    RDW 39.5 35.9 - 50.0 fL    Platelet Count 370 164 - 446 K/uL    MPV 10.6 9.0 - 12.9 fL    Neutrophils-Polys 72.90 (H) 44.00 - 72.00 %    Lymphocytes 20.20 (L) 22.00 - 41.00 %    Monocytes 5.20 0.00 - 13.40 %    Eosinophils 0.90 0.00 - 6.90 %    Basophils 0.40 0.00 - 1.80 %    Immature Granulocytes 0.40 0.00 - 0.90 %    Nucleated RBC 0.00 0.00 - 0.20 /100 WBC    Neutrophils (Absolute) 8.21 (H) 1.82 - 7.42 K/uL    Lymphs (Absolute) 2.27 1.00 - 4.80 K/uL    Monos (Absolute) 0.58 0.00 - 0.85 K/uL    Eos (Absolute) 0.10 0.00 - 0.51 K/uL    Baso (Absolute) 0.04 0.00 - 0.12 K/uL    Immature Granulocytes (abs) 0.04 0.00 - 0.11 K/uL    NRBC (Absolute) 0.00 K/uL     COURSE & MEDICAL DECISION MAKING    ASSESSMENT, COURSE AND PLAN  Care Narrative: This is a 44-year-old female presenting to the emergency department for evaluation of flank pain.  On initial evaluation, the patient did not appear to be in any acute distress.  Vital signs were normal and reassuring.  Physical exam was notable for CVA tenderness bilaterally.  Her exam was otherwise reassuring.  Workup was initiated in triage.    Patient had a leukocytosis of 11.2 with a neutrophilic predominance.  Her vital signs were reassuring and I am less concerned for sepsis.    Urinalysis was nitrite positive and had many bacteria with  WBCs.  It appears that she  has failed outpatient management with the Bactrim.  I attempted to find a previous urine culture from her 2 previous visits but was unable to find it in the chart.  The plan was made to start her on ceftriaxone and admit to the hospital.    9:29 AM - I discussed the case with Dr Perry, hospitalist.  He agreed with the plan and accepted the patient.    ADDITIONAL PROBLEMS MANAGED  None    DISPOSITION AND DISCUSSIONS  I have discussed management of the patient with the following physicians and NILA's:  Dr Perry, hospitalist.    Discussion of management with other [facility] or appropriate source(s): None     FINAL IMPRESSION  1. Pyelonephritis      -ADMIT-    Electronically signed by: Ermelinda De Leon D.O., 4/29/2025 8:09 AM

## 2025-04-29 NOTE — CARE PLAN
Pt AO x 4.  Pt denies pain during initial assessment.  Call light and belongings within reach.  Bed locked and in lowest position.  Hourly rounding.  Needs currently met.         The patient is Stable - Low risk of patient condition declining or worsening         Progress made toward(s) clinical / shift goals:      Problem: Pain - Standard  Goal: Alleviation of pain or a reduction in pain to the patient’s comfort goal  Outcome: Progressing     Problem: Knowledge Deficit - Standard  Goal: Patient and family/care givers will demonstrate understanding of plan of care, disease process/condition, diagnostic tests and medications  Outcome: Progressing       Patient is not progressing towards the following goals:

## 2025-04-29 NOTE — PROGRESS NOTES
4 Eyes Skin Assessment Completed by MARGA RN and JOSE HERNÁNDEZ.    Head WDL  Ears WDL  Nose WDL  Mouth WDL  Neck WDL  Breast/Chest WDL  Shoulder Blades WDL  Spine WDL  (R) Arm/Elbow/Hand WDL  (L) Arm/Elbow/Hand WDL  Abdomen WDL  Groin WDL  Scrotum/Coccyx/Buttocks Redness and Blanching  (R) Leg WDL  (L) Leg WDL  (R) Heel/Foot/Toe Redness, Blanching, and Discoloration  (L) Heel/Foot/Toe Redness, Blanching, and Discoloration          Devices In Places PIV      Interventions In Place Pillows    Possible Skin Injury No    Pictures Uploaded Into Epic N/A  Wound Consult Placed N/A  RN Wound Prevention Protocol Ordered Yes

## 2025-04-29 NOTE — ED NOTES
Pt amb to br then to room. Urine sample obtained and sent to lab.   Pt checked in last night for the same but went home r/t long wait times. Blood work resulted at 1800.

## 2025-04-29 NOTE — ED TRIAGE NOTES
Chief Complaint   Patient presents with    Flank Pain     Loki flank pain x 1 week. Pt was diagnosed with kidney infection last week and has finished her course of abx, but its still having pain.      Pt ambulatory to triage for above complaint. A&Ox4, GCS 15, speaking in full sentences. Respirations equal and unlabored. NAD.  Appropriate protocols ordered.   Pt educated on triage process and instructed to notify staff of worsening condition.   Pt placed in the lobby in stable condition.

## 2025-04-29 NOTE — ED TRIAGE NOTES
"  Chief Complaint   Patient presents with    Flank Pain     Pt presents to triage for bilateral flank pain. Pt states she was seen and prescribed abx for pyelonephritis last week, has finished the prescription but the pain has not gotten better. Pt also reports intermittent dysuria, denies fevers.     Pt is alert/oriented, following commands, and answering questions appropriately. Pt placed in lobby and educated on triage process. Pt encouraged to alert staff for any changes in condition.    .BP (!) 147/68   Pulse 85   Temp 36.2 °C (97.1 °F) (Temporal)   Resp 14   Ht 1.651 m (5' 5\")   Wt 78.5 kg (173 lb 1 oz)   LMP 10/01/2024 (Approximate)   SpO2 97%   BMI 28.80 kg/m²     "

## 2025-04-29 NOTE — ASSESSMENT & PLAN NOTE
Failed outpatient Bactrim.  Start Unasyn  A prior Urine culture a few years back suggest Enterococcus.  Urine culture growing E coli  Pain control  U/S kidneys, r/o stones  Pain control

## 2025-04-29 NOTE — ED NOTES
Pharmacy Medication Reconciliation      ~Medication reconciliation updated and complete per patient & pharmacy  ~Allergies have been verified and updated     ~Is dispense history available in EPIC: yes  ~Patient home pharmacy :  [facility] [phone]      ~Anticoagulants (rivaroxaban, apixaban, edoxaban, dabigatran, warfarin, enoxaparin) taken in the last 14 days? No      Patient had a 7 day course of Septra DS BID started 4/19/25 completed 4/25/25

## 2025-04-29 NOTE — ASSESSMENT & PLAN NOTE
A1c:13.3 one year ago.  Monitor for hypoglycemia  Monitor accuchecks and cover with SSI  Encourage healthy lifestyle and dietary choices.  Hold outpatient meds  Added lantus 10 units Q evening

## 2025-04-30 ENCOUNTER — APPOINTMENT (OUTPATIENT)
Dept: RADIOLOGY | Facility: MEDICAL CENTER | Age: 45
End: 2025-04-30
Attending: STUDENT IN AN ORGANIZED HEALTH CARE EDUCATION/TRAINING PROGRAM
Payer: COMMERCIAL

## 2025-04-30 VITALS
BODY MASS INDEX: 28.36 KG/M2 | TEMPERATURE: 97.5 F | SYSTOLIC BLOOD PRESSURE: 159 MMHG | HEIGHT: 65 IN | WEIGHT: 170.19 LBS | HEART RATE: 79 BPM | RESPIRATION RATE: 18 BRPM | DIASTOLIC BLOOD PRESSURE: 82 MMHG | OXYGEN SATURATION: 96 %

## 2025-04-30 LAB
ANION GAP SERPL CALC-SCNC: 10 MMOL/L (ref 7–16)
BACTERIA UR CULT: ABNORMAL
BACTERIA UR CULT: ABNORMAL
BUN SERPL-MCNC: 14 MG/DL (ref 8–22)
CALCIUM SERPL-MCNC: 8.9 MG/DL (ref 8.5–10.5)
CHLORIDE SERPL-SCNC: 101 MMOL/L (ref 96–112)
CO2 SERPL-SCNC: 23 MMOL/L (ref 20–33)
CREAT SERPL-MCNC: 0.66 MG/DL (ref 0.5–1.4)
ERYTHROCYTE [DISTWIDTH] IN BLOOD BY AUTOMATED COUNT: 38.2 FL (ref 35.9–50)
GFR SERPLBLD CREATININE-BSD FMLA CKD-EPI: 109 ML/MIN/1.73 M 2
GLUCOSE BLD STRIP.AUTO-MCNC: 247 MG/DL (ref 65–99)
GLUCOSE BLD STRIP.AUTO-MCNC: 269 MG/DL (ref 65–99)
GLUCOSE BLD STRIP.AUTO-MCNC: 303 MG/DL (ref 65–99)
GLUCOSE BLD STRIP.AUTO-MCNC: 309 MG/DL (ref 65–99)
GLUCOSE SERPL-MCNC: 290 MG/DL (ref 65–99)
HCT VFR BLD AUTO: 34.1 % (ref 37–47)
HGB BLD-MCNC: 11.1 G/DL (ref 12–16)
MCH RBC QN AUTO: 27.2 PG (ref 27–33)
MCHC RBC AUTO-ENTMCNC: 32.6 G/DL (ref 32.2–35.5)
MCV RBC AUTO: 83.6 FL (ref 81.4–97.8)
PLATELET # BLD AUTO: 379 K/UL (ref 164–446)
PMV BLD AUTO: 10.5 FL (ref 9–12.9)
POTASSIUM SERPL-SCNC: 4.3 MMOL/L (ref 3.6–5.5)
RBC # BLD AUTO: 4.08 M/UL (ref 4.2–5.4)
SIGNIFICANT IND 70042: ABNORMAL
SITE SITE: ABNORMAL
SODIUM SERPL-SCNC: 134 MMOL/L (ref 135–145)
SOURCE SOURCE: ABNORMAL
WBC # BLD AUTO: 10.6 K/UL (ref 4.8–10.8)

## 2025-04-30 PROCEDURE — 770006 HCHG ROOM/CARE - MED/SURG/GYN SEMI*

## 2025-04-30 PROCEDURE — 82962 GLUCOSE BLOOD TEST: CPT

## 2025-04-30 PROCEDURE — 700111 HCHG RX REV CODE 636 W/ 250 OVERRIDE (IP): Mod: JZ | Performed by: HOSPITALIST

## 2025-04-30 PROCEDURE — 96376 TX/PRO/DX INJ SAME DRUG ADON: CPT

## 2025-04-30 PROCEDURE — 700105 HCHG RX REV CODE 258: Performed by: HOSPITALIST

## 2025-04-30 PROCEDURE — 99233 SBSQ HOSP IP/OBS HIGH 50: CPT | Performed by: STUDENT IN AN ORGANIZED HEALTH CARE EDUCATION/TRAINING PROGRAM

## 2025-04-30 PROCEDURE — 700102 HCHG RX REV CODE 250 W/ 637 OVERRIDE(OP): Performed by: HOSPITALIST

## 2025-04-30 PROCEDURE — A9270 NON-COVERED ITEM OR SERVICE: HCPCS | Performed by: HOSPITALIST

## 2025-04-30 PROCEDURE — 80048 BASIC METABOLIC PNL TOTAL CA: CPT

## 2025-04-30 PROCEDURE — 96372 THER/PROPH/DIAG INJ SC/IM: CPT

## 2025-04-30 PROCEDURE — 76775 US EXAM ABDO BACK WALL LIM: CPT

## 2025-04-30 PROCEDURE — 700102 HCHG RX REV CODE 250 W/ 637 OVERRIDE(OP): Performed by: STUDENT IN AN ORGANIZED HEALTH CARE EDUCATION/TRAINING PROGRAM

## 2025-04-30 PROCEDURE — 85027 COMPLETE CBC AUTOMATED: CPT

## 2025-04-30 RX ADMIN — INSULIN LISPRO 5 UNITS: 100 INJECTION, SOLUTION INTRAVENOUS; SUBCUTANEOUS at 07:31

## 2025-04-30 RX ADMIN — INSULIN LISPRO 3 UNITS: 100 INJECTION, SOLUTION INTRAVENOUS; SUBCUTANEOUS at 12:45

## 2025-04-30 RX ADMIN — AMPICILLIN AND SULBACTAM 3 G: 1; 2 INJECTION, POWDER, FOR SOLUTION INTRAMUSCULAR; INTRAVENOUS at 04:33

## 2025-04-30 RX ADMIN — AMPICILLIN AND SULBACTAM 3 G: 1; 2 INJECTION, POWDER, FOR SOLUTION INTRAMUSCULAR; INTRAVENOUS at 12:36

## 2025-04-30 RX ADMIN — INSULIN LISPRO 6 UNITS: 100 INJECTION, SOLUTION INTRAVENOUS; SUBCUTANEOUS at 20:51

## 2025-04-30 RX ADMIN — AMPICILLIN AND SULBACTAM 3 G: 1; 2 INJECTION, POWDER, FOR SOLUTION INTRAMUSCULAR; INTRAVENOUS at 19:34

## 2025-04-30 RX ADMIN — SENNOSIDES AND DOCUSATE SODIUM 2 TABLET: 50; 8.6 TABLET ORAL at 19:25

## 2025-04-30 RX ADMIN — INSULIN GLARGINE-YFGN 10 UNITS: 100 INJECTION, SOLUTION SUBCUTANEOUS at 19:28

## 2025-04-30 RX ADMIN — AMPICILLIN AND SULBACTAM 3 G: 1; 2 INJECTION, POWDER, FOR SOLUTION INTRAMUSCULAR; INTRAVENOUS at 23:26

## 2025-04-30 RX ADMIN — INSULIN LISPRO 6 UNITS: 100 INJECTION, SOLUTION INTRAVENOUS; SUBCUTANEOUS at 19:28

## 2025-04-30 RX ADMIN — RIVAROXABAN 10 MG: 10 TABLET, FILM COATED ORAL at 19:24

## 2025-04-30 ASSESSMENT — ENCOUNTER SYMPTOMS
NAUSEA: 0
FLANK PAIN: 1
COUGH: 0
VOMITING: 0
DIZZINESS: 0
CHILLS: 0
WEAKNESS: 1
HEARTBURN: 0
FEVER: 0

## 2025-04-30 ASSESSMENT — PAIN DESCRIPTION - PAIN TYPE
TYPE: ACUTE PAIN
TYPE: ACUTE PAIN

## 2025-04-30 NOTE — CARE PLAN
The patient is Stable - Low risk of patient condition declining or worsening    Shift Goals  Clinical Goals: Pain management/patient safety  Patient Goals: Rest  Family Goals: Updates    Progress made toward(s) clinical / shift goals:  Patient on IV abx, Resting comfortably, bed in lowest , call light within reach.    Patient is not progressing towards the following goals:

## 2025-04-30 NOTE — PROGRESS NOTES
Hospital Medicine Daily Progress Note    Date of Service  4/30/2025    Chief Complaint  Alison Armstrong is a 44 y.o. female admitted 4/29/2025 with UTI    Hospital Course  No notes on file    Alison Armstrong is a 44 y.o. female w/ a hx of DMII, HTN, DLD, who presented 4/29/2025 with bilateral flank pain, dysuria, and fever.  She was recently seen in the ER and discharged ~1 week ago with bactrim.  She completed the course of antibiotics but one day after completion of antibiotics was have dysuria, fever, and back pain. She has been using acetaminophen for pain/fever.       Here in the ER: wbc:11.2, and UA showing UTI.  She has bilateral flank pain.      Interval Problem Update  4/30:  Seen and evaluated patient at the bedside. Denies fever and chills. No nausea or vomiting. Has been experiencing bilateral flank pain.   On RA  /75  WBC 11>>10  Blood glucose 247. On Sliding scale insulin. Will add lantus 10 units evening   Urine culture is growing E coli. Sensitivity pending.   Renal US has been ordered        I have discussed this patient's plan of care and discharge plan at IDT rounds today with Case Management, Nursing, Nursing leadership, and other members of the IDT team.    Consultants/Specialty  None     Code Status  Full Code    Disposition  Not Medically Cleared  I have placed the appropriate orders for post-discharge needs.    Review of Systems  Review of Systems   Constitutional:  Negative for chills and fever.   Respiratory:  Negative for cough.    Gastrointestinal:  Negative for heartburn, nausea and vomiting.   Genitourinary:  Positive for flank pain.   Neurological:  Positive for weakness. Negative for dizziness.        Physical Exam  Temp:  [36.4 °C (97.5 °F)-37.1 °C (98.7 °F)] 37.1 °C (98.7 °F)  Pulse:  [75-83] 77  Resp:  [16-18] 17  BP: (133-151)/(68-75) 143/75  SpO2:  [92 %-98 %] 98 %    Physical Exam  Constitutional:       Appearance: Normal appearance.   HENT:      Head:  Normocephalic.      Nose: Nose normal.   Cardiovascular:      Rate and Rhythm: Normal rate.   Pulmonary:      Effort: Pulmonary effort is normal.      Breath sounds: Normal breath sounds.   Abdominal:      General: Abdomen is flat.      Palpations: Abdomen is soft.      Comments: Bilateral CVA tenderness    Musculoskeletal:         General: Normal range of motion.   Skin:     General: Skin is warm.   Neurological:      General: No focal deficit present.      Mental Status: She is alert and oriented to person, place, and time.         Fluids    Intake/Output Summary (Last 24 hours) at 4/30/2025 1643  Last data filed at 4/30/2025 0725  Gross per 24 hour   Intake 440 ml   Output --   Net 440 ml        Laboratory  Recent Labs     04/28/25  1800 04/29/25  0852 04/30/25  0710   WBC 11.9* 11.2* 10.6   RBC 4.03* 4.13* 4.08*   HEMOGLOBIN 11.0* 11.6* 11.1*   HEMATOCRIT 33.9* 35.2* 34.1*   MCV 84.1 85.2 83.6   MCH 27.3 28.1 27.2   MCHC 32.4 33.0 32.6   RDW 38.3 39.5 38.2   PLATELETCT 377 370 379   MPV 10.8 10.6 10.5     Recent Labs     04/28/25  1800 04/29/25  0852 04/30/25  0710   SODIUM 132* 137 134*   POTASSIUM 4.5 4.2 4.3   CHLORIDE 98 105 101   CO2 21 22 23   GLUCOSE 348* 271* 290*   BUN 23* 20 14   CREATININE 0.93 0.75 0.66   CALCIUM 9.2 8.8 8.9                   Imaging  US-RENAL    (Results Pending)         Assessment/Plan  * Acute pyelonephritis- (present on admission)  Assessment & Plan  Failed outpatient Bactrim.  Start Unasyn  A prior Urine culture a few years back suggest Enterococcus.  Urine culture growing E coli  Pain control  U/S kidneys, r/o stones  Pain control    Dyslipidemia  Assessment & Plan  Continue outpatient statin    Overweight (BMI 25.0-29.9)  Assessment & Plan  Body mass index is 28.8 kg/m².  Encourage healthy lifestyle and weight loss    Primary hypertension  Assessment & Plan  losartan    Type 2 diabetes mellitus without complication, without long-term current use of insulin ([facility])  Assessment &  Plan  A1c:13.3 one year ago.  Monitor for hypoglycemia  Monitor accuchecks and cover with SSI  Encourage healthy lifestyle and dietary choices.  Hold outpatient meds  Added lantus 10 units Q evening         VTE prophylaxis: xarelto    I have performed a physical exam and reviewed and updated ROS and Plan today (4/30/2025). In review of yesterday's note (4/29/2025), there are no changes except as documented above.    Patient has a high medical complexity, complex decision making and is at high risk of complication, morbidity, and mortality    Greater than 51 minutes spent prepping to see patient (e.g. review of tests) obtaining and/or reviewing separately obtained history. Performing a medically appropriate examination and/ evaluation.  Counseling and educating the patient/family/caregiver.  Ordering medications, tests, or procedures.  Referring and communicating with other health care professionals.  Documenting clinical information in EPIC.  Independently interpreting results and communicating results to patient/family/caregiver.  Care coordination

## 2025-04-30 NOTE — DISCHARGE PLANNING
Case Management Discharge Planning    Admission Date: 4/29/2025  GMLOS: 2.8  ALOS: 1    6-Clicks ADL Score: 24  6-Clicks Mobility Score: 24      Anticipated Discharge Dispo: Discharge Disposition: Discharged to home/self care (01)    DME Needed: No    Action(s) Taken: Discussed in IDT. Cultures are pending. US pending. Possible DC on 5.1.25. No needs.     Escalations Completed: None    Medically Clear: No    Next Steps: follow for needs    Barriers to Discharge: Medical clearance    Is the patient up for discharge tomorrow: No   - possible. Will assist with discharge transport if needed. Lives in [place].

## 2025-04-30 NOTE — CARE PLAN
Pt AO x 4.  Pt denies pain during initial assessment.  Call light and belongings within reach.  Bed locked and in lowest position.  Hourly rounding.  Needs currently met.           The patient is Stable - Low risk of patient condition declining or worsening    Shift Goals  Clinical Goals: Pain management/patient safety  Patient Goals: Rest  Family Goals: Updates    Progress made toward(s) clinical / shift goals:      Problem: Pain - Standard  Goal: Alleviation of pain or a reduction in pain to the patient’s comfort goal  Outcome: Progressing     Problem: Knowledge Deficit - Standard  Goal: Patient and family/care givers will demonstrate understanding of plan of care, disease process/condition, diagnostic tests and medications  Outcome: Progressing       Patient is not progressing towards the following goals:

## 2025-05-01 ENCOUNTER — PHARMACY VISIT (OUTPATIENT)
Dept: PHARMACY | Facility: MEDICAL CENTER | Age: 45
End: 2025-05-01
Payer: COMMERCIAL

## 2025-05-01 VITALS
TEMPERATURE: 97.2 F | HEART RATE: 74 BPM | HEIGHT: 65 IN | BODY MASS INDEX: 28.36 KG/M2 | DIASTOLIC BLOOD PRESSURE: 66 MMHG | RESPIRATION RATE: 17 BRPM | WEIGHT: 170.19 LBS | SYSTOLIC BLOOD PRESSURE: 143 MMHG | OXYGEN SATURATION: 97 %

## 2025-05-01 LAB
ANION GAP SERPL CALC-SCNC: 8 MMOL/L (ref 7–16)
BACTERIA UR CULT: ABNORMAL
BACTERIA UR CULT: ABNORMAL
BUN SERPL-MCNC: 21 MG/DL (ref 8–22)
CALCIUM SERPL-MCNC: 9.1 MG/DL (ref 8.5–10.5)
CHLORIDE SERPL-SCNC: 104 MMOL/L (ref 96–112)
CO2 SERPL-SCNC: 27 MMOL/L (ref 20–33)
CREAT SERPL-MCNC: 0.78 MG/DL (ref 0.5–1.4)
EKG IMPRESSION: NORMAL
ERYTHROCYTE [DISTWIDTH] IN BLOOD BY AUTOMATED COUNT: 38.4 FL (ref 35.9–50)
GFR SERPLBLD CREATININE-BSD FMLA CKD-EPI: 94 ML/MIN/1.73 M 2
GLUCOSE BLD STRIP.AUTO-MCNC: 270 MG/DL (ref 65–99)
GLUCOSE SERPL-MCNC: 196 MG/DL (ref 65–99)
HCT VFR BLD AUTO: 32.8 % (ref 37–47)
HGB BLD-MCNC: 10.6 G/DL (ref 12–16)
MCH RBC QN AUTO: 26.8 PG (ref 27–33)
MCHC RBC AUTO-ENTMCNC: 32.3 G/DL (ref 32.2–35.5)
MCV RBC AUTO: 83 FL (ref 81.4–97.8)
PLATELET # BLD AUTO: 369 K/UL (ref 164–446)
PMV BLD AUTO: 10.6 FL (ref 9–12.9)
POTASSIUM SERPL-SCNC: 4.2 MMOL/L (ref 3.6–5.5)
RBC # BLD AUTO: 3.95 M/UL (ref 4.2–5.4)
SIGNIFICANT IND 70042: ABNORMAL
SITE SITE: ABNORMAL
SODIUM SERPL-SCNC: 139 MMOL/L (ref 135–145)
SOURCE SOURCE: ABNORMAL
WBC # BLD AUTO: 9.7 K/UL (ref 4.8–10.8)

## 2025-05-01 PROCEDURE — G0378 HOSPITAL OBSERVATION PER HR: HCPCS

## 2025-05-01 PROCEDURE — RXMED WILLOW AMBULATORY MEDICATION CHARGE: Performed by: STUDENT IN AN ORGANIZED HEALTH CARE EDUCATION/TRAINING PROGRAM

## 2025-05-01 PROCEDURE — 93005 ELECTROCARDIOGRAM TRACING: CPT | Mod: TC | Performed by: STUDENT IN AN ORGANIZED HEALTH CARE EDUCATION/TRAINING PROGRAM

## 2025-05-01 PROCEDURE — 93010 ELECTROCARDIOGRAM REPORT: CPT | Performed by: INTERNAL MEDICINE

## 2025-05-01 PROCEDURE — 96372 THER/PROPH/DIAG INJ SC/IM: CPT

## 2025-05-01 PROCEDURE — 96376 TX/PRO/DX INJ SAME DRUG ADON: CPT

## 2025-05-01 PROCEDURE — 99239 HOSP IP/OBS DSCHRG MGMT >30: CPT | Performed by: STUDENT IN AN ORGANIZED HEALTH CARE EDUCATION/TRAINING PROGRAM

## 2025-05-01 PROCEDURE — 82962 GLUCOSE BLOOD TEST: CPT

## 2025-05-01 PROCEDURE — 700105 HCHG RX REV CODE 258: Performed by: HOSPITALIST

## 2025-05-01 PROCEDURE — 85027 COMPLETE CBC AUTOMATED: CPT

## 2025-05-01 PROCEDURE — 700111 HCHG RX REV CODE 636 W/ 250 OVERRIDE (IP): Mod: JZ | Performed by: HOSPITALIST

## 2025-05-01 PROCEDURE — 80048 BASIC METABOLIC PNL TOTAL CA: CPT

## 2025-05-01 RX ORDER — LEVOFLOXACIN 750 MG/1
750 TABLET, FILM COATED ORAL DAILY
Qty: 7 TABLET | Refills: 0 | Status: ACTIVE | OUTPATIENT
Start: 2025-05-01 | End: 2025-05-08

## 2025-05-01 RX ADMIN — AMPICILLIN AND SULBACTAM 3 G: 1; 2 INJECTION, POWDER, FOR SOLUTION INTRAMUSCULAR; INTRAVENOUS at 05:01

## 2025-05-01 RX ADMIN — AMPICILLIN AND SULBACTAM 3 G: 1; 2 INJECTION, POWDER, FOR SOLUTION INTRAMUSCULAR; INTRAVENOUS at 11:57

## 2025-05-01 RX ADMIN — INSULIN LISPRO 5 UNITS: 100 INJECTION, SOLUTION INTRAVENOUS; SUBCUTANEOUS at 11:49

## 2025-05-01 ASSESSMENT — PAIN DESCRIPTION - PAIN TYPE: TYPE: ACUTE PAIN

## 2025-05-01 ASSESSMENT — PATIENT HEALTH QUESTIONNAIRE - PHQ9
SUM OF ALL RESPONSES TO PHQ9 QUESTIONS 1 AND 2: 0
2. FEELING DOWN, DEPRESSED, IRRITABLE, OR HOPELESS: NOT AT ALL
1. LITTLE INTEREST OR PLEASURE IN DOING THINGS: NOT AT ALL

## 2025-05-01 NOTE — DISCHARGE PLANNING
Patient rolled back to observation/outpatient status per attending MD determination Yoko and UR committee MD secondary review [facility] Patient Status Update completed.

## 2025-05-01 NOTE — DISCHARGE SUMMARY
Discharge Summary    CHIEF COMPLAINT ON ADMISSION  Chief Complaint   Patient presents with    Flank Pain       Reason for Admission  sent by MD     Admission Date  4/29/2025    CODE STATUS  Full Code    HPI & HOSPITAL COURSE  For full details please refer to HPI    Alison Armstrong is a 44 y.o. female w/ a hx of DMII, HTN, DLD, who presented 4/29/2025 with bilateral flank pain, dysuria, and fever.  She was recently seen in the ER and discharged ~1 week ago with bactrim.  She completed the course of antibiotics but one day after completion of antibiotics was have dysuria, fever, and back pain. She has been using acetaminophen for pain/fever.       Here in the ER: wbc:11.2, and UA showing UTI.  She has bilateral flank pain.    Patient has significant response to the IV antibiotics with improving and her nausea and bilateral flank pain. Has been afebrile. Tolerating the diet.   Urine culture grew ESBL sensitive to levofloxacin.  Discussed with the pharmacy.  Reviewed EKG with no QTc prolongation.  Will start patient on levofloxacin to finish the course.  Advised to follow-up with PCP outpatient.  Return to ER precaution was given.          Therefore, she is discharged in good and stable condition to home with close outpatient follow-up.    The patient met 2-midnight criteria for an inpatient stay at the time of discharge.    Discharge Date  5/1    FOLLOW UP ITEMS POST DISCHARGE  PCP    DISCHARGE DIAGNOSES  Principal Problem:    Acute pyelonephritis (POA: Yes)  Active Problems:    Type 2 diabetes mellitus without complication, without long-term current use of insulin (HCC) (POA: Unknown)    Primary hypertension (POA: Unknown)    Overweight (BMI 25.0-29.9) (POA: Unknown)    Dyslipidemia (POA: Unknown)  Resolved Problems:    * No resolved hospital problems. *      FOLLOW UP  No future appointments.  No follow-up provider specified.    MEDICATIONS ON DISCHARGE     Medication List        START taking these medications         Instructions   levoFLOXacin 500 MG tablet  Commonly known as: Levaquin   Take 1.5 Tablets by mouth every day for 7 days.  Dose: 750 mg            CONTINUE taking these medications        Instructions   acetaminophen 325 MG Tabs  Commonly known as: Tylenol   Take 325 mg by mouth every four hours as needed for Mild Pain.  Dose: 325 mg     Empagliflozin-metFORMIN HCl 12.5-1000 MG Tabs   Take 1 Tablet by mouth 2 times a day.  Dose: 1 Tablet     losartan 25 MG Tabs  Commonly known as: Cozaar   Take 25 mg by mouth every day.  Dose: 25 mg     rosuvastatin 20 MG Tabs  Commonly known as: Crestor   Take 20 mg by mouth every day.  Dose: 20 mg     Tradjenta 5 MG Tabs tablet  Generic drug: linagliptin   Take 5 mg by mouth every day.  Dose: 5 mg            STOP taking these medications      sulfamethoxazole-trimethoprim 800-160 MG tablet  Commonly known as: Bactrim DS              Allergies  No Known Allergies    DIET  Orders Placed This Encounter   Procedures    Diet Order Diet: Consistent CHO (Diabetic)     Standing Status:   Standing     Number of Occurrences:   1     Diet::   Consistent CHO (Diabetic) [4]       ACTIVITY  As tolerated.  Weight bearing as tolerated    CONSULTATIONS  none    PROCEDURES  none    LABORATORY  Lab Results   Component Value Date    SODIUM 139 05/01/2025    POTASSIUM 4.2 05/01/2025    CHLORIDE 104 05/01/2025    CO2 27 05/01/2025    GLUCOSE 196 (H) 05/01/2025    BUN 21 05/01/2025    CREATININE 0.78 05/01/2025        Lab Results   Component Value Date    WBC 9.7 05/01/2025    HEMOGLOBIN 10.6 (L) 05/01/2025    HEMATOCRIT 32.8 (L) 05/01/2025    PLATELETCT 369 05/01/2025        Total time of the discharge process exceeds 32 minutes.

## 2025-05-01 NOTE — PROGRESS NOTES
Discharge instructions reviewed and signed, all questions asked, PIV removed, Meds to Beds delivered.

## 2025-05-01 NOTE — DISCHARGE PLANNING
Case Management Discharge Planning        Action(s) Taken: Pt status changed from inpatient to observation status.     0850: RN CM delivered Observation Outpatient Information - Form 2 to patient.

## 2025-05-01 NOTE — CARE PLAN
The patient is Stable - Low risk of patient condition declining or worsening    Shift Goals  Clinical Goals: Patient safety/ pain management  Patient Goals: Rest  Family Goals: updates    Progress made toward(s) clinical / shift goals:   Patient on IV abx, Resting comfortably, bed in lowest , call light within reach. Ambulates to the bathroom and back to bed.     Patient is not progressing towards the following goals:

## 2025-05-01 NOTE — CARE PLAN
The patient is Stable - Low risk of patient condition declining or worsening    Shift Goals  Clinical Goals: Patient safety, pain management  Patient Goals: comfort  Family Goals: brennon    Progress made toward(s) clinical / shift goals:    Problem: Pain - Standard  Goal: Alleviation of pain or a reduction in pain to the patient’s comfort goal  Outcome: Progressing  Note: Patient has PRN medications if needed for pain. Patient is able to make all needs known.     Problem: Knowledge Deficit - Standard  Goal: Patient and family/care givers will demonstrate understanding of plan of care, disease process/condition, diagnostic tests and medications  Outcome: Progressing  Note: Patient verbalizes understanding of care plan.       Patient is not progressing towards the following goals:

## 2025-06-19 ENCOUNTER — HOSPITAL ENCOUNTER (OUTPATIENT)
Facility: MEDICAL CENTER | Age: 45
End: 2025-06-19
Attending: INTERNAL MEDICINE
Payer: COMMERCIAL

## 2025-06-19 ENCOUNTER — HOSPITAL ENCOUNTER (OUTPATIENT)
Dept: RADIOLOGY | Facility: MEDICAL CENTER | Age: 45
End: 2025-06-19
Attending: STUDENT IN AN ORGANIZED HEALTH CARE EDUCATION/TRAINING PROGRAM
Payer: COMMERCIAL

## 2025-06-19 DIAGNOSIS — R26.9 URINARY TRACT INFECTION AFTER IMMOBILITY: ICD-10-CM

## 2025-06-19 DIAGNOSIS — B96.20 ARTHRITIS DUE TO E. COLI (HCC): ICD-10-CM

## 2025-06-19 DIAGNOSIS — M00.80 ARTHRITIS DUE TO E. COLI (HCC): ICD-10-CM

## 2025-06-19 DIAGNOSIS — N39.0 URINARY TRACT INFECTION AFTER IMMOBILITY: ICD-10-CM

## 2025-06-19 DIAGNOSIS — Z79.2 ENCOUNTER FOR LONG-TERM (CURRENT) USE OF ANTIBIOTICS: ICD-10-CM

## 2025-06-19 LAB
ALBUMIN SERPL BCP-MCNC: 3.9 G/DL (ref 3.2–4.9)
ALBUMIN/GLOB SERPL: 1.4 G/DL
ALP SERPL-CCNC: 82 U/L (ref 30–99)
ALT SERPL-CCNC: 16 U/L (ref 2–50)
ANION GAP SERPL CALC-SCNC: 14 MMOL/L (ref 7–16)
AST SERPL-CCNC: 14 U/L (ref 12–45)
BASOPHILS # BLD AUTO: 1 % (ref 0–1.8)
BASOPHILS # BLD: 0.08 K/UL (ref 0–0.12)
BILIRUB SERPL-MCNC: <0.2 MG/DL (ref 0.1–1.5)
BUN SERPL-MCNC: 13 MG/DL (ref 8–22)
CALCIUM ALBUM COR SERPL-MCNC: 9.3 MG/DL (ref 8.5–10.5)
CALCIUM SERPL-MCNC: 9.2 MG/DL (ref 8.5–10.5)
CHLORIDE SERPL-SCNC: 104 MMOL/L (ref 96–112)
CO2 SERPL-SCNC: 20 MMOL/L (ref 20–33)
CREAT SERPL-MCNC: 0.67 MG/DL (ref 0.5–1.4)
CRP SERPL HS-MCNC: <0.3 MG/DL (ref 0–0.75)
EOSINOPHIL # BLD AUTO: 0.1 K/UL (ref 0–0.51)
EOSINOPHIL NFR BLD: 1.3 % (ref 0–6.9)
ERYTHROCYTE [DISTWIDTH] IN BLOOD BY AUTOMATED COUNT: 43.6 FL (ref 35.9–50)
GFR SERPLBLD CREATININE-BSD FMLA CKD-EPI: 109 ML/MIN/1.73 M 2
GLOBULIN SER CALC-MCNC: 2.8 G/DL (ref 1.9–3.5)
GLUCOSE SERPL-MCNC: 307 MG/DL (ref 65–99)
HCT VFR BLD AUTO: 39.9 % (ref 37–47)
HGB BLD-MCNC: 12.4 G/DL (ref 12–16)
IMM GRANULOCYTES # BLD AUTO: 0.03 K/UL (ref 0–0.11)
IMM GRANULOCYTES NFR BLD AUTO: 0.4 % (ref 0–0.9)
LYMPHOCYTES # BLD AUTO: 3.26 K/UL (ref 1–4.8)
LYMPHOCYTES NFR BLD: 41.9 % (ref 22–41)
MCH RBC QN AUTO: 27 PG (ref 27–33)
MCHC RBC AUTO-ENTMCNC: 31.1 G/DL (ref 32.2–35.5)
MCV RBC AUTO: 86.9 FL (ref 81.4–97.8)
MONOCYTES # BLD AUTO: 0.36 K/UL (ref 0–0.85)
MONOCYTES NFR BLD AUTO: 4.6 % (ref 0–13.4)
NEUTROPHILS # BLD AUTO: 3.95 K/UL (ref 1.82–7.42)
NEUTROPHILS NFR BLD: 50.8 % (ref 44–72)
NRBC # BLD AUTO: 0 K/UL
NRBC BLD-RTO: 0 /100 WBC (ref 0–0.2)
PLATELET # BLD AUTO: 217 K/UL (ref 164–446)
PMV BLD AUTO: 12.4 FL (ref 9–12.9)
POTASSIUM SERPL-SCNC: 4.2 MMOL/L (ref 3.6–5.5)
PROT SERPL-MCNC: 6.7 G/DL (ref 6–8.2)
RBC # BLD AUTO: 4.59 M/UL (ref 4.2–5.4)
SODIUM SERPL-SCNC: 138 MMOL/L (ref 135–145)
WBC # BLD AUTO: 7.8 K/UL (ref 4.8–10.8)

## 2025-06-19 PROCEDURE — 86140 C-REACTIVE PROTEIN: CPT

## 2025-06-19 PROCEDURE — C1751 CATH, INF, PER/CENT/MIDLINE: HCPCS

## 2025-06-19 PROCEDURE — 80053 COMPREHEN METABOLIC PANEL: CPT

## 2025-06-19 PROCEDURE — 85025 COMPLETE CBC W/AUTO DIFF WBC: CPT

## 2025-06-19 PROCEDURE — 85652 RBC SED RATE AUTOMATED: CPT

## 2025-06-19 NOTE — PROCEDURES
Vascular Access Team    Date of Insertion: 06/19/2025  Arm Circumference: 35cm  Internal length: 12cm  External Length: 0cm  Vein Occupancy %: 36%  Reason for Midline: abx  Labs: WBC 9.7 , , BUN 21, Cr 0.78, GFR 94     Orders confirmed, vessel patency confirmed with ultrasound. Risks and benefits of procedure explained to patient and education regarding line associated bloodstream infections provided. Questions answered.     Power Midline placed in IFEANYI per licensed provider order with ultrasound guidance. 4  Fr, 1 lumen Power Midline placed in cephalic vein after 1 attempt(s). 3 mL of 1% lidocaine injected intradermally, 21 gauge microintroducer needle was visualized entering the vein and modified Seldinger technique used. 12 cm catheter inserted with good blood return. Secured at 0 cm marker. Internal positioning stylet removed and verified to be intact. Each lumen flushed without resistance with 10 mL 0.9% normal saline. Midline secured with Biopatch and Tegaderm.     Midline placement is confirmed by nurse using ultrasound and ability to flush and draw blood. Midline is appropriate for use at this time.  Patient tolerated procedure well, without complications.  No X-ray is needed for placement confirmation.  Patient condition relayed to unit RN or ordering physician via this post procedure note in the EMR.     Ultrasound images uploaded to PACS and viewable in the EMR - yes  Ultrasound imaged printed and placed in paper chart - no     BARD Power Midline ref # [id number], Lot # [id number], Expiration Date 08/31/2026

## 2025-06-19 NOTE — PROGRESS NOTES
Midline has max dwell time of 28 days. Please remove IFEANYI midline on or before 07/10/2025. Dressing changes completed by bedside RN on Saturdays.

## 2025-06-19 NOTE — PROGRESS NOTES
"Power Midline Instructions    How to Care for your Power Midline  Do not take a bath, swim, or use hot tubs when you have a Power Midline. Cover Power Midline with clear plastic wrap and tape to keep it dry while showering.  Check the Power Midline insertion site daily for leakage, redness, swelling or pain.  Flush the Power Midline only if directed by your health care provider. Let your health care provider know immediately if the Power Midline is difficult to flush or does not flush. Do not use force to flush the Power Midline.  Do not use a syringe that is less than 10 mL to flush the Power Midline.  Avoid blood pressure checks on the arm with the Power Midline.  Do not take the Power Midline out yourself. Only a trained clinical professional should remove the Power Midline.  Make sure you or anyone who accesses your Power Midline Line washes their hands before using the line.  Make sure the hub of the line is \"scrubbed\" with alcohol or chlorhexidine prior to using the line.    Dressing Changes  Change the Power Midline dressing only if instructed to do so by your health care provider. Recommended to change dressing every 7 days.  Have your Power Midline dressing changed if it becomes loose or wet.    When to seek medical attention  Power Midline is accidentally pulled all the way out  There is any type of drainage, redness, or swelling where the Power Midline enters the skin  There is noticeable increase in arm circumference due to swelling  You cannot flush the Power Midline, it is difficult to flush, or the Power Midline leaks around the insertion site when it is flushed  You notice a hole or tear in the Power Midline  You develop chills or a fever  "

## 2025-06-20 LAB — ERYTHROCYTE [SEDIMENTATION RATE] IN BLOOD BY WESTERGREN METHOD: 13 MM/HOUR (ref 0–25)

## 2025-06-23 ENCOUNTER — HOSPITAL ENCOUNTER (OUTPATIENT)
Facility: MEDICAL CENTER | Age: 45
End: 2025-06-23
Attending: INTERNAL MEDICINE
Payer: COMMERCIAL

## 2025-06-23 LAB
ALBUMIN SERPL BCP-MCNC: 4.2 G/DL (ref 3.2–4.9)
ALBUMIN/GLOB SERPL: 1.4 G/DL
ALP SERPL-CCNC: 97 U/L (ref 30–99)
ALT SERPL-CCNC: 24 U/L (ref 2–50)
ANION GAP SERPL CALC-SCNC: 13 MMOL/L (ref 7–16)
AST SERPL-CCNC: 17 U/L (ref 12–45)
BASOPHILS # BLD AUTO: 0.9 % (ref 0–1.8)
BASOPHILS # BLD: 0.07 K/UL (ref 0–0.12)
BILIRUB SERPL-MCNC: <0.2 MG/DL (ref 0.1–1.5)
BUN SERPL-MCNC: 20 MG/DL (ref 8–22)
CALCIUM ALBUM COR SERPL-MCNC: 9.4 MG/DL (ref 8.5–10.5)
CALCIUM SERPL-MCNC: 9.6 MG/DL (ref 8.5–10.5)
CHLORIDE SERPL-SCNC: 101 MMOL/L (ref 96–112)
CO2 SERPL-SCNC: 23 MMOL/L (ref 20–33)
CREAT SERPL-MCNC: 0.67 MG/DL (ref 0.5–1.4)
CRP SERPL HS-MCNC: <0.3 MG/DL (ref 0–0.75)
EOSINOPHIL # BLD AUTO: 0.16 K/UL (ref 0–0.51)
EOSINOPHIL NFR BLD: 2.1 % (ref 0–6.9)
ERYTHROCYTE [DISTWIDTH] IN BLOOD BY AUTOMATED COUNT: 42.9 FL (ref 35.9–50)
GFR SERPLBLD CREATININE-BSD FMLA CKD-EPI: 108 ML/MIN/1.73 M 2
GLOBULIN SER CALC-MCNC: 3.1 G/DL (ref 1.9–3.5)
GLUCOSE SERPL-MCNC: 256 MG/DL (ref 65–99)
HCT VFR BLD AUTO: 43.4 % (ref 37–47)
HGB BLD-MCNC: 13.3 G/DL (ref 12–16)
IMM GRANULOCYTES # BLD AUTO: 0.03 K/UL (ref 0–0.11)
IMM GRANULOCYTES NFR BLD AUTO: 0.4 % (ref 0–0.9)
LYMPHOCYTES # BLD AUTO: 3.28 K/UL (ref 1–4.8)
LYMPHOCYTES NFR BLD: 43.6 % (ref 22–41)
MCH RBC QN AUTO: 26.6 PG (ref 27–33)
MCHC RBC AUTO-ENTMCNC: 30.6 G/DL (ref 32.2–35.5)
MCV RBC AUTO: 86.8 FL (ref 81.4–97.8)
MONOCYTES # BLD AUTO: 0.29 K/UL (ref 0–0.85)
MONOCYTES NFR BLD AUTO: 3.9 % (ref 0–13.4)
NEUTROPHILS # BLD AUTO: 3.69 K/UL (ref 1.82–7.42)
NEUTROPHILS NFR BLD: 49.1 % (ref 44–72)
NRBC # BLD AUTO: 0 K/UL
NRBC BLD-RTO: 0 /100 WBC (ref 0–0.2)
PLATELET # BLD AUTO: 221 K/UL (ref 164–446)
PMV BLD AUTO: 12.6 FL (ref 9–12.9)
POTASSIUM SERPL-SCNC: 4.5 MMOL/L (ref 3.6–5.5)
PROT SERPL-MCNC: 7.3 G/DL (ref 6–8.2)
RBC # BLD AUTO: 5 M/UL (ref 4.2–5.4)
SODIUM SERPL-SCNC: 137 MMOL/L (ref 135–145)
WBC # BLD AUTO: 7.5 K/UL (ref 4.8–10.8)

## 2025-06-23 PROCEDURE — 85652 RBC SED RATE AUTOMATED: CPT

## 2025-06-23 PROCEDURE — 86140 C-REACTIVE PROTEIN: CPT

## 2025-06-23 PROCEDURE — 80053 COMPREHEN METABOLIC PANEL: CPT

## 2025-06-23 PROCEDURE — 85025 COMPLETE CBC W/AUTO DIFF WBC: CPT

## 2025-06-24 LAB — ERYTHROCYTE [SEDIMENTATION RATE] IN BLOOD BY WESTERGREN METHOD: 16 MM/HOUR (ref 0–25)
